# Patient Record
Sex: FEMALE | Race: WHITE | HISPANIC OR LATINO
[De-identification: names, ages, dates, MRNs, and addresses within clinical notes are randomized per-mention and may not be internally consistent; named-entity substitution may affect disease eponyms.]

---

## 2017-04-24 PROBLEM — Z00.00 ENCOUNTER FOR PREVENTIVE HEALTH EXAMINATION: Status: ACTIVE | Noted: 2017-04-24

## 2017-05-11 ENCOUNTER — APPOINTMENT (OUTPATIENT)
Dept: ORTHOPEDIC SURGERY | Facility: CLINIC | Age: 82
End: 2017-05-11

## 2017-05-11 RX ORDER — GENTAMICIN SULFATE 1 MG/G
0.1 OINTMENT TOPICAL
Qty: 30 | Refills: 0 | Status: ACTIVE | COMMUNITY
Start: 2017-05-03

## 2017-09-05 ENCOUNTER — APPOINTMENT (OUTPATIENT)
Dept: ORTHOPEDIC SURGERY | Facility: CLINIC | Age: 82
End: 2017-09-05
Payer: MEDICARE

## 2017-09-05 VITALS
WEIGHT: 120 LBS | HEART RATE: 67 BPM | DIASTOLIC BLOOD PRESSURE: 80 MMHG | HEIGHT: 66 IN | SYSTOLIC BLOOD PRESSURE: 110 MMHG | BODY MASS INDEX: 19.29 KG/M2

## 2017-09-05 PROCEDURE — 99213 OFFICE O/P EST LOW 20 MIN: CPT

## 2018-02-22 ENCOUNTER — APPOINTMENT (OUTPATIENT)
Dept: ORTHOPEDIC SURGERY | Facility: CLINIC | Age: 83
End: 2018-02-22
Payer: MEDICARE

## 2018-02-22 VITALS
BODY MASS INDEX: 19.29 KG/M2 | DIASTOLIC BLOOD PRESSURE: 85 MMHG | HEIGHT: 66 IN | WEIGHT: 120 LBS | HEART RATE: 93 BPM | SYSTOLIC BLOOD PRESSURE: 152 MMHG

## 2018-02-22 PROCEDURE — 73562 X-RAY EXAM OF KNEE 3: CPT | Mod: 50

## 2018-02-22 PROCEDURE — 99214 OFFICE O/P EST MOD 30 MIN: CPT

## 2018-05-29 ENCOUNTER — APPOINTMENT (OUTPATIENT)
Dept: ORTHOPEDIC SURGERY | Facility: CLINIC | Age: 83
End: 2018-05-29
Payer: MEDICARE

## 2018-05-29 VITALS
WEIGHT: 120 LBS | BODY MASS INDEX: 19.29 KG/M2 | SYSTOLIC BLOOD PRESSURE: 158 MMHG | HEART RATE: 88 BPM | HEIGHT: 66 IN | DIASTOLIC BLOOD PRESSURE: 84 MMHG

## 2018-05-29 PROCEDURE — 73564 X-RAY EXAM KNEE 4 OR MORE: CPT | Mod: 50

## 2018-05-29 PROCEDURE — 99213 OFFICE O/P EST LOW 20 MIN: CPT

## 2018-11-08 ENCOUNTER — APPOINTMENT (OUTPATIENT)
Dept: ORTHOPEDIC SURGERY | Facility: CLINIC | Age: 83
End: 2018-11-08

## 2018-11-18 ENCOUNTER — FORM ENCOUNTER (OUTPATIENT)
Age: 83
End: 2018-11-18

## 2018-11-19 ENCOUNTER — APPOINTMENT (OUTPATIENT)
Dept: ORTHOPEDIC SURGERY | Facility: CLINIC | Age: 83
End: 2018-11-19
Payer: MEDICARE

## 2018-11-19 ENCOUNTER — APPOINTMENT (OUTPATIENT)
Dept: RADIOLOGY | Facility: CLINIC | Age: 83
End: 2018-11-19

## 2018-11-19 ENCOUNTER — OUTPATIENT (OUTPATIENT)
Dept: OUTPATIENT SERVICES | Facility: HOSPITAL | Age: 83
LOS: 1 days | End: 2018-11-19
Payer: MEDICARE

## 2018-11-19 VITALS — WEIGHT: 125 LBS | HEIGHT: 66 IN | BODY MASS INDEX: 20.09 KG/M2

## 2018-11-19 DIAGNOSIS — Z87.39 PERSONAL HISTORY OF OTHER DISEASES OF THE MUSCULOSKELETAL SYSTEM AND CONNECTIVE TISSUE: ICD-10-CM

## 2018-11-19 DIAGNOSIS — S42.402A UNSPECIFIED FRACTURE OF LOWER END OF LEFT HUMERUS, INITIAL ENCOUNTER FOR CLOSED FRACTURE: ICD-10-CM

## 2018-11-19 DIAGNOSIS — M06.9 RHEUMATOID ARTHRITIS, UNSPECIFIED: ICD-10-CM

## 2018-11-19 PROCEDURE — 99214 OFFICE O/P EST MOD 30 MIN: CPT

## 2018-11-19 PROCEDURE — 73080 X-RAY EXAM OF ELBOW: CPT

## 2018-11-19 PROCEDURE — 73080 X-RAY EXAM OF ELBOW: CPT | Mod: 26,LT

## 2018-11-19 RX ORDER — AMOXICILLIN 500 MG/1
500 CAPSULE ORAL
Qty: 12 | Refills: 0 | Status: ACTIVE | COMMUNITY
Start: 2018-08-08

## 2018-11-19 RX ORDER — CEPHALEXIN 500 MG/1
500 CAPSULE ORAL
Qty: 20 | Refills: 0 | Status: ACTIVE | COMMUNITY
Start: 2018-07-26

## 2018-11-19 RX ORDER — MONTELUKAST 10 MG/1
10 TABLET, FILM COATED ORAL
Qty: 90 | Refills: 0 | Status: DISCONTINUED | COMMUNITY
Start: 2016-12-07 | End: 2018-11-19

## 2018-11-19 RX ORDER — PROMETHAZINE HYDROCHLORIDE AND CODEINE PHOSPHATE 6.25; 1 MG/5ML; MG/5ML
6.25-1 SOLUTION ORAL
Qty: 240 | Refills: 0 | Status: ACTIVE | COMMUNITY
Start: 2018-03-27

## 2018-11-19 RX ORDER — MONTELUKAST SODIUM 10 MG/1
TABLET, FILM COATED ORAL
Refills: 0 | Status: ACTIVE | COMMUNITY

## 2018-11-19 RX ORDER — FLUOCINONIDE 0.5 MG/ML
0.05 SOLUTION TOPICAL
Qty: 60 | Refills: 0 | Status: ACTIVE | COMMUNITY
Start: 2018-10-29

## 2018-11-19 RX ORDER — SILVER SULFADIAZINE 10 G/1000G
1 CREAM TOPICAL
Qty: 50 | Refills: 0 | Status: ACTIVE | COMMUNITY
Start: 2017-10-14

## 2018-11-19 RX ORDER — HYDROCHLOROTHIAZIDE 12.5 MG/1
12.5 CAPSULE ORAL
Refills: 0 | Status: ACTIVE | COMMUNITY

## 2018-11-19 RX ORDER — FLUCONAZOLE 150 MG/1
150 TABLET ORAL
Qty: 2 | Refills: 0 | Status: DISCONTINUED | COMMUNITY
Start: 2016-11-17 | End: 2018-11-19

## 2018-11-19 RX ORDER — DICLOFENAC SODIUM AND MISOPROSTOL 75; 200 MG/1; UG/1
75-0.2 TABLET, DELAYED RELEASE ORAL
Qty: 60 | Refills: 0 | Status: DISCONTINUED | COMMUNITY
Start: 2017-03-07 | End: 2018-11-19

## 2018-11-19 RX ORDER — HYDROCHLOROTHIAZIDE 12.5 MG/1
12.5 TABLET ORAL
Qty: 30 | Refills: 0 | Status: ACTIVE | COMMUNITY
Start: 2018-06-19

## 2018-11-27 ENCOUNTER — APPOINTMENT (OUTPATIENT)
Dept: ORTHOPEDIC SURGERY | Facility: CLINIC | Age: 83
End: 2018-11-27
Payer: MEDICARE

## 2018-11-27 VITALS
HEART RATE: 94 BPM | BODY MASS INDEX: 20.09 KG/M2 | DIASTOLIC BLOOD PRESSURE: 83 MMHG | SYSTOLIC BLOOD PRESSURE: 148 MMHG | WEIGHT: 125 LBS | HEIGHT: 66 IN

## 2018-11-27 DIAGNOSIS — S80.12XA CONTUSION OF LEFT KNEE, INITIAL ENCOUNTER: ICD-10-CM

## 2018-11-27 DIAGNOSIS — S80.02XA CONTUSION OF LEFT KNEE, INITIAL ENCOUNTER: ICD-10-CM

## 2018-11-27 DIAGNOSIS — S80.01XA CONTUSION OF RIGHT KNEE, INITIAL ENCOUNTER: ICD-10-CM

## 2018-11-27 PROCEDURE — 73562 X-RAY EXAM OF KNEE 3: CPT | Mod: 50

## 2018-11-27 PROCEDURE — 99214 OFFICE O/P EST MOD 30 MIN: CPT

## 2019-02-28 ENCOUNTER — APPOINTMENT (OUTPATIENT)
Dept: ORTHOPEDIC SURGERY | Facility: CLINIC | Age: 84
End: 2019-02-28

## 2019-04-04 ENCOUNTER — APPOINTMENT (OUTPATIENT)
Dept: ORTHOPEDIC SURGERY | Facility: CLINIC | Age: 84
End: 2019-04-04
Payer: MEDICARE

## 2019-04-04 PROCEDURE — 99213 OFFICE O/P EST LOW 20 MIN: CPT

## 2019-04-04 NOTE — ADDENDUM
[FreeTextEntry1] : Documented by Blossom Mari, solely acting as a scribe for Dr. Scott Fernandes on 04/04/2019.\par \par All medical record entries made by the Scribe were at my, Dr. Scott Fernandes, direction and personally dictated by me on 04/04/2019. I have reviewed the chart and agree that the record accurately reflects my personal performance of the history, physical exam, assessment and plan. I have also personally directed, reviewed, and agreed with the chart.

## 2019-04-04 NOTE — HISTORY OF PRESENT ILLNESS
[de-identified] : 83 year old female presents to the office s/p right TKA and left TKA revision, DOS: 11/19/10 for a follow-up evaluation of right knee pain. Patient was last seen on 11/27/18, where she was instructed to continue PT for further strengthening of the surrounding musculature of her knees, and advised to advance daily activities as tolerated. She notes that she had been progressing well with PT, noting no severe complaints. However, patient reports that she suddenly began experiencing immense hip pain that radiated down toward her knee, as well as a gait disturbance, for which she has begun to use a walker for aide in ambulation. Patient notes that she is not experiencing significance knee pain.

## 2019-04-04 NOTE — PHYSICAL EXAM
[Antalgic] : antalgic [Walker] : ambulates with walker [LE] : Sensory: Intact in bilateral lower extremities [DP] : dorsalis pedis 2+ and symmetric bilaterally [PT] : posterior tibial 2+ and symmetric bilaterally [Poor Appearance] : well-appearing [Acute Distress] : not in acute distress [Obese] : not obese [de-identified] : General appearance: Well nourished, well developed, pleasant, alert, and oriented x3.\par Respiratory: Breathing not labored, in no acute distress.\par HEENT: Normocephalic. EOM intact. Sclerae are clear.\par CV: No apparent abnormalities. No lower leg edema. No varicosities. Pedal pulses are palpable.\par Neurologic: Sensation is intact to light touch in the upper and lower extremities. \par Strength: No muscle weakness.\par Dermatologic: No apparent skin lesions or rash.\par Spine: C spine and L spine appear normal and move freely, normal and nontender.\par Upper Extremities: Hands, wrists, and elbows are normal and move freely. Shoulders are normal and move freely. All range of motion is symmetrical.\par Normal body habitus. Pulses are palpable.\par Review of systems, please see form for complete details. Medical data sheet was reviewed.\par \par Left knee: FROM hip, no effusion, 0 - 100, no crepitus, no medial pain, no lateral pain, no Lachman, no pivot shift, no anterior drawer, no posterior drawer, stable, anatomic alignment, extension intact with no gaps\par Right knee: FROM hip, trace effusion, 0 - 105, mild crepitus, no medial pain, no lateral pain, no Lachman, no pivot shift, no anterior drawer, no posterior drawer, stable, anatomic alignment, extension intact with no gaps\par   [de-identified] : MRI of the hip reviewed today by Dr. Fernandes shows severe arthritis.\par Please see attached report for details.\par \par

## 2019-04-04 NOTE — DISCUSSION/SUMMARY
[de-identified] : 83 year old female presents s/p right TKA and left TKA revision, DOS: 11/19/10. She notes that she had been progressing well with PT, noting no severe complaints. However, patient reports that she suddenly began experiencing immense hip pain that radiated down toward her knee, as well as a gait disturbance, for which she has begun to use a walker for aide in ambulation. Patient notes that she is not experiencing significance knee pain. We discussed the nature of the condition and treatment options, including operative and nonoperative intervention for symptom control. Information regarding the new office was provided to the patient, and she was advised to contact the office if any questions remain. As the patient's current insurance is not accepted in the new office, a referral will be provided to Dr. Damon for her hip pain and her knees for further treatment.

## 2019-04-16 ENCOUNTER — FORM ENCOUNTER (OUTPATIENT)
Age: 84
End: 2019-04-16

## 2019-04-17 ENCOUNTER — APPOINTMENT (OUTPATIENT)
Dept: ORTHOPEDIC SURGERY | Facility: CLINIC | Age: 84
End: 2019-04-17
Payer: MEDICARE

## 2019-04-17 ENCOUNTER — OUTPATIENT (OUTPATIENT)
Dept: OUTPATIENT SERVICES | Facility: HOSPITAL | Age: 84
LOS: 1 days | End: 2019-04-17
Payer: MEDICARE

## 2019-04-17 DIAGNOSIS — M16.11 UNILATERAL PRIMARY OSTEOARTHRITIS, RIGHT HIP: ICD-10-CM

## 2019-04-17 PROCEDURE — 99214 OFFICE O/P EST MOD 30 MIN: CPT

## 2019-04-17 PROCEDURE — 73521 X-RAY EXAM HIPS BI 2 VIEWS: CPT | Mod: 26

## 2019-04-17 PROCEDURE — 73521 X-RAY EXAM HIPS BI 2 VIEWS: CPT

## 2019-04-18 VITALS — HEIGHT: 66 IN | BODY MASS INDEX: 19.29 KG/M2 | WEIGHT: 120 LBS

## 2019-04-19 PROBLEM — M16.11 ARTHRITIS OF RIGHT HIP: Status: ACTIVE | Noted: 2019-04-19

## 2019-04-19 NOTE — ASSESSMENT
[FreeTextEntry1] : Assessment\par #1 severe right hip arthritis\par \par Plan\par #1 is for a right total hip arthroplasty at the patient's convenience. She'll undergo the normal preoperative clearance pathway. Risks benefits alternatives discussed in detail. This will be scheduled for the earliest mutual convenience. All questions answered today.

## 2019-04-19 NOTE — PHYSICAL EXAM
[de-identified] : X-rays of the right hip shows severe show severe arthritic changes [de-identified] : General: Not in acute distress, dressed appropriately, sitting on examination table\par Skin: Warm and dry, normal turgor, no rashes\par Neurological: AOx3, Cranial nerves grossly in tact\par Psych: Mood and affect appropriate\par \par Right Hip: No swelling edema erythema redness or drainage. Tender anteriorly and laterally. ROM: Hip flexion 120, abduction 30, int/ext rotation 45.  Pain present throughout range of motion, and guarding at terminal flexion. 5/5 strength. Normal gait.

## 2019-04-19 NOTE — HISTORY OF PRESENT ILLNESS
[de-identified] : 83-year-old lady here today for evaluation of right hip pain. Has been increasing for the last 3 years plus. It is located in the anterior groin and is nonradiating. She describes it as dull aching throbbing, worse with weightbearing and ambulation. She is minimally ambulating because of the pain. In the past she has attempted physical therapy, medications, modify activities, and injections. These used to be helpful in relieving her pain but are no longer period

## 2019-05-16 ENCOUNTER — INPATIENT (INPATIENT)
Facility: HOSPITAL | Age: 84
LOS: 3 days | Discharge: NURSING, REHAB OR LONG-TERM CARE | DRG: 470 | End: 2019-05-20
Attending: ORTHOPAEDIC SURGERY | Admitting: ORTHOPAEDIC SURGERY
Payer: MEDICARE

## 2019-05-16 ENCOUNTER — APPOINTMENT (OUTPATIENT)
Dept: ORTHOPEDIC SURGERY | Facility: HOSPITAL | Age: 84
End: 2019-05-16
Payer: MEDICARE

## 2019-05-16 VITALS
WEIGHT: 119.93 LBS | OXYGEN SATURATION: 98 % | HEART RATE: 77 BPM | TEMPERATURE: 98 F | DIASTOLIC BLOOD PRESSURE: 76 MMHG | SYSTOLIC BLOOD PRESSURE: 134 MMHG | RESPIRATION RATE: 20 BRPM | HEIGHT: 65 IN

## 2019-05-16 DIAGNOSIS — E11.9 TYPE 2 DIABETES MELLITUS WITHOUT COMPLICATIONS: ICD-10-CM

## 2019-05-16 DIAGNOSIS — Z98.890 OTHER SPECIFIED POSTPROCEDURAL STATES: Chronic | ICD-10-CM

## 2019-05-16 DIAGNOSIS — N28.9 DISORDER OF KIDNEY AND URETER, UNSPECIFIED: ICD-10-CM

## 2019-05-16 DIAGNOSIS — D64.9 ANEMIA, UNSPECIFIED: ICD-10-CM

## 2019-05-16 DIAGNOSIS — K21.9 GASTRO-ESOPHAGEAL REFLUX DISEASE WITHOUT ESOPHAGITIS: ICD-10-CM

## 2019-05-16 DIAGNOSIS — G47.30 SLEEP APNEA, UNSPECIFIED: ICD-10-CM

## 2019-05-16 DIAGNOSIS — I48.91 UNSPECIFIED ATRIAL FIBRILLATION: ICD-10-CM

## 2019-05-16 DIAGNOSIS — I10 ESSENTIAL (PRIMARY) HYPERTENSION: ICD-10-CM

## 2019-05-16 DIAGNOSIS — Z90.49 ACQUIRED ABSENCE OF OTHER SPECIFIED PARTS OF DIGESTIVE TRACT: Chronic | ICD-10-CM

## 2019-05-16 DIAGNOSIS — Z41.9 ENCOUNTER FOR PROCEDURE FOR PURPOSES OTHER THAN REMEDYING HEALTH STATE, UNSPECIFIED: Chronic | ICD-10-CM

## 2019-05-16 DIAGNOSIS — M16.11 UNILATERAL PRIMARY OSTEOARTHRITIS, RIGHT HIP: ICD-10-CM

## 2019-05-16 DIAGNOSIS — Z96.653 PRESENCE OF ARTIFICIAL KNEE JOINT, BILATERAL: Chronic | ICD-10-CM

## 2019-05-16 LAB
GLUCOSE BLDC GLUCOMTR-MCNC: 98 MG/DL — SIGNIFICANT CHANGE UP (ref 70–99)
HCT VFR BLD CALC: 32.4 % — LOW (ref 34.5–45)
HGB BLD-MCNC: 10.3 G/DL — LOW (ref 11.5–15.5)
MCHC RBC-ENTMCNC: 29.9 PG — SIGNIFICANT CHANGE UP (ref 27–34)
MCHC RBC-ENTMCNC: 31.8 GM/DL — LOW (ref 32–36)
MCV RBC AUTO: 93.9 FL — SIGNIFICANT CHANGE UP (ref 80–100)
MRSA PCR RESULT.: NEGATIVE — SIGNIFICANT CHANGE UP
NRBC # BLD: 0 /100 WBCS — SIGNIFICANT CHANGE UP (ref 0–0)
PLATELET # BLD AUTO: 249 K/UL — SIGNIFICANT CHANGE UP (ref 150–400)
RBC # BLD: 3.45 M/UL — LOW (ref 3.8–5.2)
RBC # FLD: 13.4 % — SIGNIFICANT CHANGE UP (ref 10.3–14.5)
S AUREUS DNA NOSE QL NAA+PROBE: NEGATIVE — SIGNIFICANT CHANGE UP
WBC # BLD: 11.69 K/UL — HIGH (ref 3.8–10.5)
WBC # FLD AUTO: 11.69 K/UL — HIGH (ref 3.8–10.5)

## 2019-05-16 PROCEDURE — 27130 TOTAL HIP ARTHROPLASTY: CPT | Mod: AS,RT

## 2019-05-16 PROCEDURE — 27130 TOTAL HIP ARTHROPLASTY: CPT | Mod: RT

## 2019-05-16 PROCEDURE — 72170 X-RAY EXAM OF PELVIS: CPT | Mod: 26

## 2019-05-16 RX ORDER — SODIUM CHLORIDE 9 MG/ML
1000 INJECTION, SOLUTION INTRAVENOUS
Refills: 0 | Status: DISCONTINUED | OUTPATIENT
Start: 2019-05-16 | End: 2019-05-20

## 2019-05-16 RX ORDER — DICLOFENAC SODIUM/MISOPROSTOL 50 MG-200
1 TABLET,IMMEDIATE,DELAY RELEASE,BIPHASE ORAL
Qty: 0 | Refills: 0 | DISCHARGE

## 2019-05-16 RX ORDER — CELECOXIB 200 MG/1
200 CAPSULE ORAL
Refills: 0 | Status: DISCONTINUED | OUTPATIENT
Start: 2019-05-18 | End: 2019-05-20

## 2019-05-16 RX ORDER — ASPIRIN/CALCIUM CARB/MAGNESIUM 324 MG
81 TABLET ORAL
Refills: 0 | Status: DISCONTINUED | OUTPATIENT
Start: 2019-05-16 | End: 2019-05-17

## 2019-05-16 RX ORDER — MONTELUKAST 4 MG/1
10 TABLET, CHEWABLE ORAL DAILY
Refills: 0 | Status: DISCONTINUED | OUTPATIENT
Start: 2019-05-16 | End: 2019-05-20

## 2019-05-16 RX ORDER — ACETAMINOPHEN 500 MG
1000 TABLET ORAL ONCE
Refills: 0 | Status: COMPLETED | OUTPATIENT
Start: 2019-05-16 | End: 2019-05-16

## 2019-05-16 RX ORDER — DIPHENHYDRAMINE HCL 50 MG
25 CAPSULE ORAL AT BEDTIME
Refills: 0 | Status: DISCONTINUED | OUTPATIENT
Start: 2019-05-16 | End: 2019-05-20

## 2019-05-16 RX ORDER — HYDROMORPHONE HYDROCHLORIDE 2 MG/ML
0.5 INJECTION INTRAMUSCULAR; INTRAVENOUS; SUBCUTANEOUS EVERY 4 HOURS
Refills: 0 | Status: DISCONTINUED | OUTPATIENT
Start: 2019-05-16 | End: 2019-05-20

## 2019-05-16 RX ORDER — ACETAMINOPHEN 500 MG
650 TABLET ORAL EVERY 6 HOURS
Refills: 0 | Status: COMPLETED | OUTPATIENT
Start: 2019-05-16 | End: 2019-05-19

## 2019-05-16 RX ORDER — OXYCODONE HYDROCHLORIDE 5 MG/1
5 TABLET ORAL EVERY 4 HOURS
Refills: 0 | Status: DISCONTINUED | OUTPATIENT
Start: 2019-05-16 | End: 2019-05-20

## 2019-05-16 RX ORDER — DOCUSATE SODIUM 100 MG
100 CAPSULE ORAL THREE TIMES A DAY
Refills: 0 | Status: DISCONTINUED | OUTPATIENT
Start: 2019-05-16 | End: 2019-05-20

## 2019-05-16 RX ORDER — ASPIRIN/CALCIUM CARB/MAGNESIUM 324 MG
325 TABLET ORAL
Refills: 0 | Status: DISCONTINUED | OUTPATIENT
Start: 2019-05-16 | End: 2019-05-16

## 2019-05-16 RX ORDER — MORPHINE SULFATE 50 MG/1
4 CAPSULE, EXTENDED RELEASE ORAL ONCE
Refills: 0 | Status: DISCONTINUED | OUTPATIENT
Start: 2019-05-16 | End: 2019-05-20

## 2019-05-16 RX ORDER — CELECOXIB 200 MG/1
200 CAPSULE ORAL
Refills: 0 | Status: DISCONTINUED | OUTPATIENT
Start: 2019-05-16 | End: 2019-05-16

## 2019-05-16 RX ORDER — MAGNESIUM HYDROXIDE 400 MG/1
30 TABLET, CHEWABLE ORAL
Refills: 0 | Status: DISCONTINUED | OUTPATIENT
Start: 2019-05-16 | End: 2019-05-20

## 2019-05-16 RX ORDER — CEFAZOLIN SODIUM 1 G
2000 VIAL (EA) INJECTION EVERY 8 HOURS
Refills: 0 | Status: COMPLETED | OUTPATIENT
Start: 2019-05-16 | End: 2019-05-17

## 2019-05-16 RX ORDER — OXYCODONE HYDROCHLORIDE 5 MG/1
10 TABLET ORAL EVERY 4 HOURS
Refills: 0 | Status: DISCONTINUED | OUTPATIENT
Start: 2019-05-16 | End: 2019-05-20

## 2019-05-16 RX ORDER — METOPROLOL TARTRATE 50 MG
25 TABLET ORAL DAILY
Refills: 0 | Status: DISCONTINUED | OUTPATIENT
Start: 2019-05-16 | End: 2019-05-20

## 2019-05-16 RX ORDER — TRAMADOL HYDROCHLORIDE 50 MG/1
50 TABLET ORAL EVERY 8 HOURS
Refills: 0 | Status: DISCONTINUED | OUTPATIENT
Start: 2019-05-16 | End: 2019-05-20

## 2019-05-16 RX ORDER — CELECOXIB 200 MG/1
400 CAPSULE ORAL ONCE
Refills: 0 | Status: COMPLETED | OUTPATIENT
Start: 2019-05-16 | End: 2019-05-16

## 2019-05-16 RX ORDER — FLUTICASONE PROPIONATE 50 MCG
1 SPRAY, SUSPENSION NASAL DAILY
Refills: 0 | Status: DISCONTINUED | OUTPATIENT
Start: 2019-05-16 | End: 2019-05-20

## 2019-05-16 RX ORDER — SENNA PLUS 8.6 MG/1
2 TABLET ORAL AT BEDTIME
Refills: 0 | Status: DISCONTINUED | OUTPATIENT
Start: 2019-05-16 | End: 2019-05-20

## 2019-05-16 RX ORDER — ONDANSETRON 8 MG/1
4 TABLET, FILM COATED ORAL EVERY 6 HOURS
Refills: 0 | Status: DISCONTINUED | OUTPATIENT
Start: 2019-05-16 | End: 2019-05-20

## 2019-05-16 RX ORDER — APREPITANT 80 MG/1
40 CAPSULE ORAL ONCE
Refills: 0 | Status: COMPLETED | OUTPATIENT
Start: 2019-05-16 | End: 2019-05-16

## 2019-05-16 RX ORDER — PANTOPRAZOLE SODIUM 20 MG/1
40 TABLET, DELAYED RELEASE ORAL
Refills: 0 | Status: DISCONTINUED | OUTPATIENT
Start: 2019-05-16 | End: 2019-05-20

## 2019-05-16 RX ORDER — POLYETHYLENE GLYCOL 3350 17 G/17G
17 POWDER, FOR SOLUTION ORAL DAILY
Refills: 0 | Status: DISCONTINUED | OUTPATIENT
Start: 2019-05-16 | End: 2019-05-20

## 2019-05-16 RX ORDER — HYDROCHLOROTHIAZIDE 25 MG
12.5 TABLET ORAL DAILY
Refills: 0 | Status: DISCONTINUED | OUTPATIENT
Start: 2019-05-16 | End: 2019-05-20

## 2019-05-16 RX ORDER — CEFAZOLIN SODIUM 1 G
2000 VIAL (EA) INJECTION EVERY 8 HOURS
Refills: 0 | Status: DISCONTINUED | OUTPATIENT
Start: 2019-05-16 | End: 2019-05-16

## 2019-05-16 RX ORDER — NITROFURANTOIN MACROCRYSTAL 50 MG
1 CAPSULE ORAL
Qty: 0 | Refills: 0 | DISCHARGE

## 2019-05-16 RX ADMIN — OXYCODONE HYDROCHLORIDE 5 MILLIGRAM(S): 5 TABLET ORAL at 20:30

## 2019-05-16 RX ADMIN — Medication 1000 MILLIGRAM(S): at 09:46

## 2019-05-16 RX ADMIN — OXYCODONE HYDROCHLORIDE 5 MILLIGRAM(S): 5 TABLET ORAL at 19:56

## 2019-05-16 RX ADMIN — TRAMADOL HYDROCHLORIDE 50 MILLIGRAM(S): 50 TABLET ORAL at 21:30

## 2019-05-16 RX ADMIN — Medication 100 MILLIGRAM(S): at 21:30

## 2019-05-16 RX ADMIN — APREPITANT 40 MILLIGRAM(S): 80 CAPSULE ORAL at 09:47

## 2019-05-16 RX ADMIN — CELECOXIB 400 MILLIGRAM(S): 200 CAPSULE ORAL at 09:47

## 2019-05-16 RX ADMIN — Medication 650 MILLIGRAM(S): at 21:30

## 2019-05-16 RX ADMIN — TRAMADOL HYDROCHLORIDE 50 MILLIGRAM(S): 50 TABLET ORAL at 21:58

## 2019-05-16 RX ADMIN — Medication 2000 MILLIGRAM(S): at 21:30

## 2019-05-16 RX ADMIN — Medication 650 MILLIGRAM(S): at 21:58

## 2019-05-16 NOTE — PHYSICAL THERAPY INITIAL EVALUATION ADULT - ADDITIONAL COMMENTS
Patient lives alone in private house with lift access followed by 4 steps to enter. Reports being independent with all ADLs prior to January ambulating without any Assistive Devices. However recently started using RW due to pain. Owns , SC, Commode.

## 2019-05-16 NOTE — H&P ADULT - NSHPPHYSICALEXAM_GEN_ALL_CORE
Right LE: Hip decreased ROM secondary to pain, sensation intact, DP 2+, brisk cap refill, EHL/FHL/TA/GS 5/5  Rest of PE per MD clearance

## 2019-05-16 NOTE — PHYSICAL THERAPY INITIAL EVALUATION ADULT - PERTINENT HX OF CURRENT PROBLEM, REHAB EVAL
83 y.o  F with PMH of RA, Afib, Anemia, GERD, Renal insufficiency, Sleep apnea, UTI, and PSH o fb/l hand reconstruction x2, b/l feet surgery (pins). Patient c/o right hip pain worsened over time without improvement. Failed conservative treatments. Denies numbness, tingling.. Now s/p right THR

## 2019-05-16 NOTE — PHYSICAL THERAPY INITIAL EVALUATION ADULT - GENERAL OBSERVATIONS, REHAB EVAL
Patient received semi-supine in no acute distress, +Telemetry, +O2, +SCDs, +Ice packs. Cleared by SHANNAN Palacios.

## 2019-05-16 NOTE — H&P ADULT - NSHPLABSRESULTS_GEN_ALL_CORE
Preop cbc, bmp, pt/inr, ptt, ua wnl; nasal swab dos  preop cxr wnl per clearance  preop ekg wnl per clearance   echo 12/23/18 nl LV func, EF 61-65% [place]

## 2019-05-16 NOTE — H&P ADULT - NSICDXPASTSURGICALHX_GEN_ALL_CORE_FT
PAST SURGICAL HISTORY:  H/O total knee replacement, bilateral     History of appendectomy     History of cholecystectomy     History of nasal surgery     History of surgery b/l hand reconstruction x2  with plastic hardware b/l    History of surgery b/l feet with pins

## 2019-05-16 NOTE — ASU PREOP CHECKLIST - 3.
nasal MRSA/MSSA screen nasal MRSA/MSSA screen completed on arrival followed by 3M nasal antiseptic application.

## 2019-05-16 NOTE — ASU PREOP CHECKLIST - 5.
Normal vision: sees adequately in most situations; can see medication labels, newsprint pt placed to cardiac monitor.  SR, HR 75-77. last dose metoprolol and eliquis 5/10/19.  Joss RICHARD and Dr. Solis notified.  pt placed to cardiac monitor.  SR, HR 75-77.

## 2019-05-16 NOTE — ASU PREOP CHECKLIST - 1.
bathroom offered bathroom offered.  pt assisted to bathroom with assist of 2.  placed to wheelchair upon completion for OR

## 2019-05-16 NOTE — PROGRESS NOTE ADULT - SUBJECTIVE AND OBJECTIVE BOX
Orthopedics Post Op Check    Procedure: RIGHT THR  Surgeon: LISET    Pt. stable, laying in bed comfortably ( was sleep and snoring loudly in pacu earlier 2/2 to desaturation/JENSEN). Pt. states she doesn't use cpap but feels better with blow-by on given by nurse.   Denies CP, SOB, N/V, numbness/tingling in b/l les.     Vital Signs Last 24 Hrs  T(C): 36.2 (16 May 2019 12:31), Max: 36.7 (16 May 2019 07:51)  T(F): 97.1 (16 May 2019 12:31), Max: 98.1 (16 May 2019 07:51)  HR: 82 (16 May 2019 14:46) (76 - 86)  BP: 146/61 (16 May 2019 14:46) (134/76 - 154/67)  BP(mean): 88 (16 May 2019 14:46) (88 - 97)  RR: 17 (16 May 2019 14:46) (14 - 20)  SpO2: 100% (16 May 2019 14:46) (92% - 100%)      Dressing C/D/I    Pulses: DP/PT 2+B/L LES  SLT:  intact B/L LES  Motor:  EHL/FHL- unable 2/2 to foot surgery TA/GS  5/5 b/l les                          10.3   11.69 )-----------( 249      ( 16 May 2019 14:52 )             32.4         Post op XR: prosthesis in place    A/P: 83yoFemale POD#0 s/p RIGHT THR  - Stable  - Pain Control  - DVT ppx:ASA, eloquis for afib  - Post op abx: ancef  - PT, WBS: pwb right le, wbat left le  - F/U AM Labs  - JENSEN hx, ordered cpap d/w patient 2/2 to desaturation to 80s, currently on 3L  O2 sat

## 2019-05-16 NOTE — H&P ADULT - NSICDXPASTMEDICALHX_GEN_ALL_CORE_FT
PAST MEDICAL HISTORY:  Afib     Anemia     DM (diabetes mellitus) non insulin dependent as per Dr. Choudhury H&P.  pt denies    Dysuria     GERD (gastroesophageal reflux disease)     Hip pain     HTN (hypertension)     Osteoarthritis     Osteoporosis     Renal insufficiency     Rheumatoid arthritis     Seasonal allergies     Sleep apnea does not use cpap    Stress incontinence, female     UTI (urinary tract infection)

## 2019-05-16 NOTE — PHYSICAL THERAPY INITIAL EVALUATION ADULT - CRITERIA FOR SKILLED THERAPEUTIC INTERVENTIONS
impairments found/functional limitations in following categories/rehab potential/predicted duration of therapy intervention/risk reduction/prevention/therapy frequency/anticipated discharge recommendation

## 2019-05-16 NOTE — H&P ADULT - HISTORY OF PRESENT ILLNESS
83F c/o right hip pain worsened over time without improvement. Failed conservative treatments. Denies numbness, tingling. Ambulates without assist. Presents today for elective right THR.

## 2019-05-17 ENCOUNTER — TRANSCRIPTION ENCOUNTER (OUTPATIENT)
Age: 84
End: 2019-05-17

## 2019-05-17 LAB
ANION GAP SERPL CALC-SCNC: 11 MMOL/L — SIGNIFICANT CHANGE UP (ref 5–17)
BUN SERPL-MCNC: 23 MG/DL — SIGNIFICANT CHANGE UP (ref 7–23)
CALCIUM SERPL-MCNC: 8.4 MG/DL — SIGNIFICANT CHANGE UP (ref 8.4–10.5)
CHLORIDE SERPL-SCNC: 96 MMOL/L — SIGNIFICANT CHANGE UP (ref 96–108)
CO2 SERPL-SCNC: 26 MMOL/L — SIGNIFICANT CHANGE UP (ref 22–31)
CREAT SERPL-MCNC: 0.9 MG/DL — SIGNIFICANT CHANGE UP (ref 0.5–1.3)
GLUCOSE SERPL-MCNC: 136 MG/DL — HIGH (ref 70–99)
HCT VFR BLD CALC: 29 % — LOW (ref 34.5–45)
HGB BLD-MCNC: 9.2 G/DL — LOW (ref 11.5–15.5)
MCHC RBC-ENTMCNC: 29.9 PG — SIGNIFICANT CHANGE UP (ref 27–34)
MCHC RBC-ENTMCNC: 31.7 GM/DL — LOW (ref 32–36)
MCV RBC AUTO: 94.2 FL — SIGNIFICANT CHANGE UP (ref 80–100)
NRBC # BLD: 0 /100 WBCS — SIGNIFICANT CHANGE UP (ref 0–0)
PLATELET # BLD AUTO: 228 K/UL — SIGNIFICANT CHANGE UP (ref 150–400)
POTASSIUM SERPL-MCNC: 3.3 MMOL/L — LOW (ref 3.5–5.3)
POTASSIUM SERPL-SCNC: 3.3 MMOL/L — LOW (ref 3.5–5.3)
RBC # BLD: 3.08 M/UL — LOW (ref 3.8–5.2)
RBC # FLD: 13.4 % — SIGNIFICANT CHANGE UP (ref 10.3–14.5)
SODIUM SERPL-SCNC: 133 MMOL/L — LOW (ref 135–145)
WBC # BLD: 8.49 K/UL — SIGNIFICANT CHANGE UP (ref 3.8–10.5)
WBC # FLD AUTO: 8.49 K/UL — SIGNIFICANT CHANGE UP (ref 3.8–10.5)

## 2019-05-17 PROCEDURE — 99223 1ST HOSP IP/OBS HIGH 75: CPT | Mod: GC

## 2019-05-17 RX ORDER — DEXTROSE 50 % IN WATER 50 %
25 SYRINGE (ML) INTRAVENOUS ONCE
Refills: 0 | Status: DISCONTINUED | OUTPATIENT
Start: 2019-05-17 | End: 2019-05-20

## 2019-05-17 RX ORDER — SODIUM CHLORIDE 9 MG/ML
1000 INJECTION, SOLUTION INTRAVENOUS
Refills: 0 | Status: DISCONTINUED | OUTPATIENT
Start: 2019-05-17 | End: 2019-05-20

## 2019-05-17 RX ORDER — GLUCAGON INJECTION, SOLUTION 0.5 MG/.1ML
1 INJECTION, SOLUTION SUBCUTANEOUS ONCE
Refills: 0 | Status: DISCONTINUED | OUTPATIENT
Start: 2019-05-17 | End: 2019-05-20

## 2019-05-17 RX ORDER — APIXABAN 2.5 MG/1
2.5 TABLET, FILM COATED ORAL EVERY 12 HOURS
Refills: 0 | Status: DISCONTINUED | OUTPATIENT
Start: 2019-05-17 | End: 2019-05-20

## 2019-05-17 RX ORDER — DEXTROSE 50 % IN WATER 50 %
15 SYRINGE (ML) INTRAVENOUS ONCE
Refills: 0 | Status: DISCONTINUED | OUTPATIENT
Start: 2019-05-17 | End: 2019-05-20

## 2019-05-17 RX ORDER — DEXTROSE 50 % IN WATER 50 %
12.5 SYRINGE (ML) INTRAVENOUS ONCE
Refills: 0 | Status: DISCONTINUED | OUTPATIENT
Start: 2019-05-17 | End: 2019-05-20

## 2019-05-17 RX ORDER — INSULIN LISPRO 100/ML
VIAL (ML) SUBCUTANEOUS
Refills: 0 | Status: DISCONTINUED | OUTPATIENT
Start: 2019-05-17 | End: 2019-05-17

## 2019-05-17 RX ORDER — POTASSIUM CHLORIDE 20 MEQ
20 PACKET (EA) ORAL ONCE
Refills: 0 | Status: COMPLETED | OUTPATIENT
Start: 2019-05-17 | End: 2019-05-18

## 2019-05-17 RX ADMIN — TRAMADOL HYDROCHLORIDE 50 MILLIGRAM(S): 50 TABLET ORAL at 21:55

## 2019-05-17 RX ADMIN — Medication 2000 MILLIGRAM(S): at 05:43

## 2019-05-17 RX ADMIN — TRAMADOL HYDROCHLORIDE 50 MILLIGRAM(S): 50 TABLET ORAL at 14:51

## 2019-05-17 RX ADMIN — POLYETHYLENE GLYCOL 3350 17 GRAM(S): 17 POWDER, FOR SOLUTION ORAL at 13:13

## 2019-05-17 RX ADMIN — Medication 650 MILLIGRAM(S): at 18:30

## 2019-05-17 RX ADMIN — Medication 12.5 MILLIGRAM(S): at 05:43

## 2019-05-17 RX ADMIN — Medication 650 MILLIGRAM(S): at 13:14

## 2019-05-17 RX ADMIN — Medication 650 MILLIGRAM(S): at 18:32

## 2019-05-17 RX ADMIN — Medication 1 SPRAY(S): at 18:31

## 2019-05-17 RX ADMIN — MAGNESIUM HYDROXIDE 30 MILLILITER(S): 400 TABLET, CHEWABLE ORAL at 21:54

## 2019-05-17 RX ADMIN — PANTOPRAZOLE SODIUM 40 MILLIGRAM(S): 20 TABLET, DELAYED RELEASE ORAL at 05:43

## 2019-05-17 RX ADMIN — TRAMADOL HYDROCHLORIDE 50 MILLIGRAM(S): 50 TABLET ORAL at 15:10

## 2019-05-17 RX ADMIN — MONTELUKAST 10 MILLIGRAM(S): 4 TABLET, CHEWABLE ORAL at 13:14

## 2019-05-17 RX ADMIN — Medication 100 MILLIGRAM(S): at 21:55

## 2019-05-17 RX ADMIN — Medication 650 MILLIGRAM(S): at 05:43

## 2019-05-17 RX ADMIN — Medication 81 MILLIGRAM(S): at 05:43

## 2019-05-17 RX ADMIN — TRAMADOL HYDROCHLORIDE 50 MILLIGRAM(S): 50 TABLET ORAL at 05:43

## 2019-05-17 RX ADMIN — Medication 100 MILLIGRAM(S): at 14:51

## 2019-05-17 RX ADMIN — SENNA PLUS 2 TABLET(S): 8.6 TABLET ORAL at 21:54

## 2019-05-17 RX ADMIN — Medication 25 MILLIGRAM(S): at 05:43

## 2019-05-17 RX ADMIN — Medication 650 MILLIGRAM(S): at 13:45

## 2019-05-17 RX ADMIN — TRAMADOL HYDROCHLORIDE 50 MILLIGRAM(S): 50 TABLET ORAL at 22:45

## 2019-05-17 RX ADMIN — Medication 100 MILLIGRAM(S): at 05:43

## 2019-05-17 RX ADMIN — APIXABAN 2.5 MILLIGRAM(S): 2.5 TABLET, FILM COATED ORAL at 18:32

## 2019-05-17 NOTE — CONSULT NOTE ADULT - ASSESSMENT
82 y/o F with RA, Afib, DM, HTN, GERD who presents for Right THR:    1) Post-op state Right THR  * OOB-C  * Physical therapy evaluation  * Aggressive incentive spirometry  * Pain control and bowel regimen  * Mechanical LE ppx     2) Afib: Cont eliquis and metoprolol    3) Essential HTN: Controlled, cont current regimen    4) Constipation: Please give an additional dose of miralax if no BM today by this afternoon    5) GERD: Cont PPI    6) Anemia: Monitoring CBC daily, no signs of bleeding    7) Hyponatremia: Suspect a small component of hypovolemia. Cont to monitor Chem 7

## 2019-05-17 NOTE — OCCUPATIONAL THERAPY INITIAL EVALUATION ADULT - WEIGHT-BEARING RESTRICTIONS: TOILET, REHAB EVAL
Patient lives alone in private house with lift access followed by 4 steps to enter. Reports being independent with all ADLs prior to January ambulating without any Assistive Devices. However recently started using RW due to pain. Owns RW, SC, Commode./partial weight-bearing

## 2019-05-17 NOTE — OCCUPATIONAL THERAPY INITIAL EVALUATION ADULT - WEIGHT-BEARING RESTRICTIONS: STAND/SIT, REHAB EVAL
partial weight-bearing/Patient lives alone in private house with lift access followed by 4 steps to enter. Reports being independent with all ADLs prior to January ambulating without any Assistive Devices. However recently started using RW due to pain. Owns RW, SC, Commode.

## 2019-05-17 NOTE — DISCHARGE NOTE PROVIDER - CARE PROVIDER_API CALL
Rojas Damon)  Orthopaedic Surgery  [address]  [place], [place] [address]  Phone: [phone]  Fax: [phone]  Follow Up Time:

## 2019-05-17 NOTE — PROGRESS NOTE ADULT - SUBJECTIVE AND OBJECTIVE BOX
Ortho Note    Pt comfortable without complaints, pain controlled.  Denies CP, SOB, N/V, numbness/tingling down le b/l    Vital Signs Last 24 Hrs  T(C): --  T(F): --  HR: 78 (05-17-19 @ 08:42) (78 - 78)  BP: --  BP(mean): --  RR: 17 (05-17-19 @ 08:42) (17 - 17)  SpO2: 98% (05-17-19 @ 08:42) (98% - 98%)      General: Pt Alert and oriented, NAD  DSG gauze tegaderm right hip C/D/I  Pulses: DPs +2 b/l   Sensation:  SILT throughout le b/l  Motor: EHL/FHL/TA/GS                          9.2    8.49  )-----------( 228      ( 17 May 2019 07:59 )             29.0   17 May 2019 07:59    133    |  96     |  23     ----------------------------<  136    3.3     |  26     |  0.90     Ca    8.4        17 May 2019 07:59        A/P: 83yFemale POD#1 s/p right OLIVIA   - Stable  - Pain Control as needed  - DVT ppx:  home eliquis  - PT, WBS: 50% PWB RLE  - Trend labs  - May restart home dose eliquis today  - Dispo: felipa pending auth    Ortho Pager [phone]

## 2019-05-17 NOTE — DISCHARGE NOTE PROVIDER - NSDCFUADDINST_GEN_ALL_CORE_FT
50% protected weight on operated hip using a rolling walker  Anterior and posterior hip precautions to prevent hip dislocation  No crossing your legs. Do not bend past your waist.  Use long-handled reach to pick things up if purchased.  Sit in a high chair.  If you do not have a high chair, use cushions on the chair  No strenuous activity, heavy lifting, driving or returning to work until cleared by MD.  You may shower - dressing is water-resistant, no soaking in bathtubs.  Keep dressing on your hip until the follow up appointment.  Try to have regular bowel movements, take stool softener or laxative if necessary.  May take Pepcid or Zantac for upset stomach.  Swelling may travel all the way down leg to foot, this is normal and will subside in a few weeks.  Call to schedule an appt with Dr. Damon for follow up.    Contact your doctor if you experience: fever greater than 101.5, chills, chest pain, difficulty breathing, redness or excessive drainage around the incision, other concerns.  Follow up with your primary care provider.

## 2019-05-17 NOTE — PROGRESS NOTE ADULT - SUBJECTIVE AND OBJECTIVE BOX
Orthopedics    Pt. stable, laying in bed comfortably  Denies CP, SOB, N/V, numbness/tingling in b/l les.     Vital Signs Last 24 Hrs  T(C): 36.3 (17 May 2019 04:59), Max: 36.3 (16 May 2019 16:06)  T(F): 97.4 (17 May 2019 04:59), Max: 97.4 (17 May 2019 01:01)  HR: 83 (17 May 2019 04:59) (76 - 96)  BP: 131/60 (17 May 2019 04:59) (122/58 - 161/70)  BP(mean): 92 (16 May 2019 16:06) (88 - 104)  RR: 16 (17 May 2019 04:59) (11 - 18)  SpO2: 99% (17 May 2019 04:59) (92% - 100%)      Dressing C/D/I    Pulses: DP/PT 2+  SLT:  intact s/s/sp/dp/t   Motor:  EHL/FHL- unable 2/2 to previous surgery TA/GS  5/5                          10.3   11.69 )-----------( 249      ( 16 May 2019 14:52 )             32.4       A/P: 83yoFemale s/p RIGHT THR  - Stable  - Pain Control  - DVT ppx: ASA, eliquis for afib (POD2 likely)   - Post op abx: ancef  - PT, WBS: PWB 50% RLE   - F/U AM Labs

## 2019-05-17 NOTE — CONSULT NOTE ADULT - SUBJECTIVE AND OBJECTIVE BOX
Patient is a 83y old  Female who presents with a chief complaint of Right hip pain (17 May 2019 11:51)      History of Present Illness:  Reason for Admission: Right hip pain	  History of Present Illness: 	  83F c/o right hip pain worsened over time without improvement. Failed conservative treatments. Denies numbness, tingling. Ambulates without assist. S/P THR Right POD1. Doing well, no current complaints.      Review of Systems:  Review of Systems: musculoskeletal: +joint pain right hip	  Other Review of Systems: All other review of systems negative, except as noted in HPI	      Allergies and Intolerances:        Allergies:  	No Known Allergies:     Home Medications:   * Patient Currently Takes Medications as of 16-May-2019 08:50 documented in Structured Notes  · 	Metoprolol Succinate ER 25 mg oral tablet, extended release: 1 tab(s) orally once a day, Last Dose Taken: 10-May-2019   · 	pantoprazole 20 mg oral delayed release tablet: 1 tab(s) orally once a day, Last Dose Taken: 10-May-2019   · 	Nasonex 50 mcg/inh nasal spray: 2 spray(s) nasal once a day, As Needed, Last Dose Taken: April, 2019   · 	Eliquis 2.5 mg oral tablet: 1 tab(s) orally 2 times a day, Last Dose Taken: 10-May-2019   · 	Singulair 10 mg oral tablet: 1 tab(s) orally once a day, Last Dose Taken: 02-May-2019   · 	hydroCHLOROthiazide 12.5 mg oral tablet: 1 tab(s) orally once a day, Last Dose Taken: 15-May-2019 AM  · 	meloxicam: 1 tab(s) orally once a day, Last Dose Taken: 06-May-2019     Patient History:    Past Medical, Past Surgical, and Family History:  PAST MEDICAL HISTORY:  Afib     Anemia     DM (diabetes mellitus) non insulin dependent as per Dr. Choudhury H&P.  pt denies    Dysuria     GERD (gastroesophageal reflux disease)     Hip pain     HTN (hypertension)     Osteoarthritis     Osteoporosis     Renal insufficiency     Rheumatoid arthritis     Seasonal allergies     Sleep apnea does not use cpap    Stress incontinence, female     UTI (urinary tract infection).     PAST SURGICAL HISTORY:  H/O total knee replacement, bilateral     History of appendectomy     History of cholecystectomy     History of nasal surgery     History of surgery b/l hand reconstruction x2  with plastic hardware b/l    History of surgery b/l feet with pins.     Social History:  Social History (marital status, living situation, occupation, tobacco use, alcohol and drug use, and sexual history): never smoker	      INTERVAL HPI/OVERNIGHT EVENTS: No acute events O/N. No complaints at this time.     Review of Systems: 12 point review of systems otherwise negative      MEDICATIONS  (STANDING):  acetaminophen   Tablet .. 650 milliGRAM(s) Oral every 6 hours  apixaban 2.5 milliGRAM(s) Oral every 12 hours  docusate sodium 100 milliGRAM(s) Oral three times a day  fluticasone propionate 50 MICROgram(s)/spray Nasal Spray 1 Spray(s) Both Nostrils daily  hydrochlorothiazide 12.5 milliGRAM(s) Oral daily  lactated ringers. 1000 milliLiter(s) (120 mL/Hr) IV Continuous <Continuous>  metoprolol succinate ER 25 milliGRAM(s) Oral daily  montelukast 10 milliGRAM(s) Oral daily  morphine  - Injectable 4 milliGRAM(s) IV Push once  pantoprazole    Tablet 40 milliGRAM(s) Oral before breakfast  polyethylene glycol 3350 17 Gram(s) Oral daily  traMADol 50 milliGRAM(s) Oral every 8 hours    MEDICATIONS  (PRN):  aluminum hydroxide/magnesium hydroxide/simethicone Suspension 30 milliLiter(s) Oral four times a day PRN Indigestion  diphenhydrAMINE 25 milliGRAM(s) Oral at bedtime PRN Insomnia  HYDROmorphone  Injectable 0.5 milliGRAM(s) IV Push every 4 hours PRN Severe Pain (7 - 10)  magnesium hydroxide Suspension 30 milliLiter(s) Oral four times a day PRN Constipation  ondansetron Injectable 4 milliGRAM(s) IV Push every 6 hours PRN Nausea and/or Vomiting  oxyCODONE    IR 5 milliGRAM(s) Oral every 4 hours PRN Mild Pain (1 - 3)  oxyCODONE    IR 10 milliGRAM(s) Oral every 4 hours PRN Moderate Pain (4 - 6)  senna 2 Tablet(s) Oral at bedtime PRN Constipation      Allergies    No Known Allergies    Intolerances          Vital Signs Last 24 Hrs  T(C): 36.3 (17 May 2019 09:30), Max: 36.3 (16 May 2019 16:06)  T(F): 97.4 (17 May 2019 09:30), Max: 97.4 (17 May 2019 01:01)  HR: 80 (17 May 2019 09:30) (76 - 96)  BP: 126/62 (17 May 2019 09:30) (122/58 - 161/70)  BP(mean): 92 (16 May 2019 16:06) (88 - 104)  RR: 18 (17 May 2019 09:30) (11 - 18)  SpO2: 99% (17 May 2019 09:30) (92% - 100%)  CAPILLARY BLOOD GLUCOSE          05-16 @ 07:01  -  05-17 @ 07:00  --------------------------------------------------------  IN: 0 mL / OUT: 900 mL / NET: -900 mL        Physical Exam:    General:  NAD  HEENT:  Nonicteric  CV:  Irregular, no murmurs  Lungs:  CTA B/L, no wheezes, rales, rhonchi  Abdomen:  Soft, non-tender  Extremities:  Right thigh dressing C/D/I with minimal tenderness, RA hand deformities, ulnar deviation  Skin:  Warm and dry, no rashes  Neuro:  Nonfocal  No Restraints    LABS:                        9.2    8.49  )-----------( 228      ( 17 May 2019 07:59 )             29.0     05-17    133<L>  |  96  |  23  ----------------------------<  136<H>  3.3<L>   |  26  |  0.90    Ca    8.4      17 May 2019 07:59

## 2019-05-17 NOTE — OCCUPATIONAL THERAPY INITIAL EVALUATION ADULT - ADDITIONAL COMMENTS
Patient lives alone in private house with elevator access. Independent  with all ADLs prior to January when pain started. Started using RW when pain started. At baseline patient has RA which affects her fine motor coordination and . Owns RW, SC, Commode.

## 2019-05-17 NOTE — OCCUPATIONAL THERAPY INITIAL EVALUATION ADULT - PLANNED THERAPY INTERVENTIONS, OT EVAL
transfer training/ADL retraining/fine motor coordination training/balance training/bed mobility training

## 2019-05-17 NOTE — DISCHARGE NOTE PROVIDER - HOSPITAL COURSE
Admitted to orthopedic service, Dr. Damon     Surgery on 5/16/19     Minnie-op Antibiotics    Pain control    DVT prophylaxis    OOB/Physical Therapy

## 2019-05-17 NOTE — DISCHARGE NOTE PROVIDER - NSDCCPCAREPLAN_GEN_ALL_CORE_FT
PRINCIPAL DISCHARGE DIAGNOSIS  Diagnosis: Primary osteoarthritis of right hip  Assessment and Plan of Treatment: Primary osteoarthritis of right hip

## 2019-05-18 LAB
ANION GAP SERPL CALC-SCNC: 8 MMOL/L — SIGNIFICANT CHANGE UP (ref 5–17)
BUN SERPL-MCNC: 29 MG/DL — HIGH (ref 7–23)
CALCIUM SERPL-MCNC: 8.6 MG/DL — SIGNIFICANT CHANGE UP (ref 8.4–10.5)
CHLORIDE SERPL-SCNC: 96 MMOL/L — SIGNIFICANT CHANGE UP (ref 96–108)
CO2 SERPL-SCNC: 29 MMOL/L — SIGNIFICANT CHANGE UP (ref 22–31)
CREAT SERPL-MCNC: 0.9 MG/DL — SIGNIFICANT CHANGE UP (ref 0.5–1.3)
GLUCOSE SERPL-MCNC: 88 MG/DL — SIGNIFICANT CHANGE UP (ref 70–99)
HBA1C BLD-MCNC: 4.8 % — SIGNIFICANT CHANGE UP (ref 4–5.6)
HCT VFR BLD CALC: 27.3 % — LOW (ref 34.5–45)
HGB BLD-MCNC: 8.7 G/DL — LOW (ref 11.5–15.5)
MCHC RBC-ENTMCNC: 29.9 PG — SIGNIFICANT CHANGE UP (ref 27–34)
MCHC RBC-ENTMCNC: 31.9 GM/DL — LOW (ref 32–36)
MCV RBC AUTO: 93.8 FL — SIGNIFICANT CHANGE UP (ref 80–100)
NRBC # BLD: 0 /100 WBCS — SIGNIFICANT CHANGE UP (ref 0–0)
PLATELET # BLD AUTO: 222 K/UL — SIGNIFICANT CHANGE UP (ref 150–400)
POTASSIUM SERPL-MCNC: 3.7 MMOL/L — SIGNIFICANT CHANGE UP (ref 3.5–5.3)
POTASSIUM SERPL-SCNC: 3.7 MMOL/L — SIGNIFICANT CHANGE UP (ref 3.5–5.3)
RBC # BLD: 2.91 M/UL — LOW (ref 3.8–5.2)
RBC # FLD: 13.4 % — SIGNIFICANT CHANGE UP (ref 10.3–14.5)
SODIUM SERPL-SCNC: 133 MMOL/L — LOW (ref 135–145)
WBC # BLD: 7.3 K/UL — SIGNIFICANT CHANGE UP (ref 3.8–10.5)
WBC # FLD AUTO: 7.3 K/UL — SIGNIFICANT CHANGE UP (ref 3.8–10.5)

## 2019-05-18 PROCEDURE — 99233 SBSQ HOSP IP/OBS HIGH 50: CPT

## 2019-05-18 RX ADMIN — PANTOPRAZOLE SODIUM 40 MILLIGRAM(S): 20 TABLET, DELAYED RELEASE ORAL at 06:17

## 2019-05-18 RX ADMIN — TRAMADOL HYDROCHLORIDE 50 MILLIGRAM(S): 50 TABLET ORAL at 06:20

## 2019-05-18 RX ADMIN — Medication 650 MILLIGRAM(S): at 00:09

## 2019-05-18 RX ADMIN — Medication 650 MILLIGRAM(S): at 06:18

## 2019-05-18 RX ADMIN — APIXABAN 2.5 MILLIGRAM(S): 2.5 TABLET, FILM COATED ORAL at 17:17

## 2019-05-18 RX ADMIN — Medication 650 MILLIGRAM(S): at 07:00

## 2019-05-18 RX ADMIN — Medication 650 MILLIGRAM(S): at 18:00

## 2019-05-18 RX ADMIN — TRAMADOL HYDROCHLORIDE 50 MILLIGRAM(S): 50 TABLET ORAL at 13:31

## 2019-05-18 RX ADMIN — Medication 20 MILLIEQUIVALENT(S): at 00:09

## 2019-05-18 RX ADMIN — TRAMADOL HYDROCHLORIDE 50 MILLIGRAM(S): 50 TABLET ORAL at 07:00

## 2019-05-18 RX ADMIN — TRAMADOL HYDROCHLORIDE 50 MILLIGRAM(S): 50 TABLET ORAL at 23:30

## 2019-05-18 RX ADMIN — CELECOXIB 200 MILLIGRAM(S): 200 CAPSULE ORAL at 07:00

## 2019-05-18 RX ADMIN — CELECOXIB 200 MILLIGRAM(S): 200 CAPSULE ORAL at 18:00

## 2019-05-18 RX ADMIN — TRAMADOL HYDROCHLORIDE 50 MILLIGRAM(S): 50 TABLET ORAL at 22:40

## 2019-05-18 RX ADMIN — Medication 12.5 MILLIGRAM(S): at 06:18

## 2019-05-18 RX ADMIN — MONTELUKAST 10 MILLIGRAM(S): 4 TABLET, CHEWABLE ORAL at 13:31

## 2019-05-18 RX ADMIN — Medication 650 MILLIGRAM(S): at 17:18

## 2019-05-18 RX ADMIN — APIXABAN 2.5 MILLIGRAM(S): 2.5 TABLET, FILM COATED ORAL at 06:17

## 2019-05-18 RX ADMIN — Medication 25 MILLIGRAM(S): at 06:18

## 2019-05-18 RX ADMIN — CELECOXIB 200 MILLIGRAM(S): 200 CAPSULE ORAL at 17:21

## 2019-05-18 RX ADMIN — Medication 650 MILLIGRAM(S): at 00:45

## 2019-05-18 RX ADMIN — CELECOXIB 200 MILLIGRAM(S): 200 CAPSULE ORAL at 06:17

## 2019-05-18 RX ADMIN — TRAMADOL HYDROCHLORIDE 50 MILLIGRAM(S): 50 TABLET ORAL at 14:00

## 2019-05-18 RX ADMIN — Medication 100 MILLIGRAM(S): at 06:17

## 2019-05-18 NOTE — PROGRESS NOTE ADULT - SUBJECTIVE AND OBJECTIVE BOX
Ortho Note    Pt comfortable without complaints, progressing with PT, pain controlled  Denies CP, SOB, N/V, numbness/tingling     Vital Signs Last 24 Hrs  T(C): 36 (05-18-19 @ 08:14), Max: 36 (05-18-19 @ 08:14)  T(F): 96.8 (05-18-19 @ 08:14), Max: 96.8 (05-18-19 @ 08:14)  HR: 78 (05-18-19 @ 08:14) (69 - 78)  BP: 159/65 (05-18-19 @ 08:14) (156/65 - 159/65)  BP(mean): --  RR: 17 (05-18-19 @ 08:14) (17 - 17)  SpO2: 100% (05-18-19 @ 08:14) (100% - 100%)    Dressing C/D/I  Pulses: DP/PT 2+  SLT:  intact s/s/sp/dp/t   Motor:  EHL/FHL- unable 2/2 to previous surgery TA/GS  5/5                          8.7    7.30  )-----------( 222      ( 18 May 2019 07:43 )             27.3   18 May 2019 07:43    133    |  96     |  29     ----------------------------<  88     3.7     |  29     |  0.90     Ca    8.6        18 May 2019 07:43        A/P: 83yoFemale s/p RIGHT THR  - Stable  - Pain Control  - DVT ppx: ASA, eliquis for afib  - PT, WBS: PWB 50% RLE   - dispo rehab     Ortho Pager [phone]

## 2019-05-18 NOTE — PROGRESS NOTE ADULT - SUBJECTIVE AND OBJECTIVE BOX
Patient is a 83y old  Female who presents with a chief complaint of Right hip pain (18 May 2019 09:03)      INTERVAL HPI/OVERNIGHT EVENTS: Pt seen and examined at bedside. Multiple BMs this morning, no longer constipated.           ROS  (- ) headache  ( -  )fevers/chills  ( - ) dyspnea  (  - ) cough  (  - ) chest pain  (  - ) palpatations  ( - ) dizziness/lightheadedness  (  - ) nausea/vomiting  (  - ) abd pain  (  - ) diarrhea  (  - ) melena  (  - ) hematochezia  (  - ) dysuria   ( - ) hematuria  (  - ) leg swelling    ( - ) calf tenderness  (  - ) motor weakness  ( - ) extremity numbness  ( - ) back pain  ( + ) tolerating POs  ( + ) BM  ROS: 12 point review of systems otherwise negative              MEDICATIONS  (STANDING):  acetaminophen   Tablet .. 650 milliGRAM(s) Oral every 6 hours  apixaban 2.5 milliGRAM(s) Oral every 12 hours  celecoxib 200 milliGRAM(s) Oral two times a day  dextrose 5%. 1000 milliLiter(s) (50 mL/Hr) IV Continuous <Continuous>  dextrose 50% Injectable 12.5 Gram(s) IV Push once  dextrose 50% Injectable 25 Gram(s) IV Push once  dextrose 50% Injectable 25 Gram(s) IV Push once  docusate sodium 100 milliGRAM(s) Oral three times a day  fluticasone propionate 50 MICROgram(s)/spray Nasal Spray 1 Spray(s) Both Nostrils daily  hydrochlorothiazide 12.5 milliGRAM(s) Oral daily  lactated ringers. 1000 milliLiter(s) (120 mL/Hr) IV Continuous <Continuous>  metoprolol succinate ER 25 milliGRAM(s) Oral daily  montelukast 10 milliGRAM(s) Oral daily  morphine  - Injectable 4 milliGRAM(s) IV Push once  pantoprazole    Tablet 40 milliGRAM(s) Oral before breakfast  polyethylene glycol 3350 17 Gram(s) Oral daily  traMADol 50 milliGRAM(s) Oral every 8 hours    MEDICATIONS  (PRN):  aluminum hydroxide/magnesium hydroxide/simethicone Suspension 30 milliLiter(s) Oral four times a day PRN Indigestion  bisacodyl Suppository 10 milliGRAM(s) Rectal daily PRN If no bowel movement by POD#2  dextrose 40% Gel 15 Gram(s) Oral once PRN Blood Glucose LESS THAN 70 milliGRAM(s)/deciliter  diphenhydrAMINE 25 milliGRAM(s) Oral at bedtime PRN Insomnia  glucagon  Injectable 1 milliGRAM(s) IntraMuscular once PRN Glucose LESS THAN 70 milligrams/deciliter  HYDROmorphone  Injectable 0.5 milliGRAM(s) IV Push every 4 hours PRN Severe Pain (7 - 10)  magnesium hydroxide Suspension 30 milliLiter(s) Oral four times a day PRN Constipation  ondansetron Injectable 4 milliGRAM(s) IV Push every 6 hours PRN Nausea and/or Vomiting  oxyCODONE    IR 5 milliGRAM(s) Oral every 4 hours PRN Mild Pain (1 - 3)  oxyCODONE    IR 10 milliGRAM(s) Oral every 4 hours PRN Moderate Pain (4 - 6)  senna 2 Tablet(s) Oral at bedtime PRN Constipation      Allergies    No Known Allergies    Intolerances          Vital Signs Last 24 Hrs  T(C): 37 (18 May 2019 13:53), Max: 37 (18 May 2019 13:53)  T(F): 98.6 (18 May 2019 13:53), Max: 98.6 (18 May 2019 13:53)  HR: 72 (18 May 2019 13:53) (69 - 78)  BP: 140/63 (18 May 2019 13:53) (116/73 - 159/65)  BP(mean): --  RR: 17 (18 May 2019 13:53) (16 - 18)  SpO2: 100% (18 May 2019 13:53) (100% - 100%)  CAPILLARY BLOOD GLUCOSE          05-17 @ 07:01  -  05-18 @ 07:00  --------------------------------------------------------  IN: 400 mL / OUT: 850 mL / NET: -450 mL        Physical Exam:    General:  Well appearing   HEENT:  Nonicteric, PERRLA, oral pharynx w/o erythema/exudate,  neck supple  CV:  [id number] , irregular,  no JVD  Lungs:  CTA B/L, no wheezes, rales, rhonchi  Abdomen:  positive BS, Soft, non-tender, non distended, no hepatosplenomegaly  Extremities: Right thigh dressing C/D/I with minimal tenderness, RA hand deformities, ulnar deviation  Back: no cva or midline tenderness  Skin:  Warm and dry   :  No alcazar  Neuro:  AAOx3,  CN II-XII grossly intact, 5/5 str all 4 ext, sensation intact, (-) dysmetria b/l (-) dysdiadochokinesia bl  No Restraints    LABS:                        8.7    7.30  )-----------( 222      ( 18 May 2019 07:43 )             27.3     05-18    133<L>  |  96  |  29<H>  ----------------------------<  88  3.7   |  29  |  0.90    Ca    8.6      18 May 2019 07:43              RADIOLOGY & ADDITIONAL TESTS: reviewed

## 2019-05-18 NOTE — PROGRESS NOTE ADULT - ASSESSMENT
82 y/o F with RA, Afib, DM, HTN, GERD who presents for Right THR:    1) Post-op state Right THR  * OOB-C  * Physical therapy evaluation  * Aggressive incentive spirometry  * Pain control and bowel regimen  * Mechanical LE ppx     2) Afib: Cont eliquis and metoprolol    3) Essential HTN: Controlled, cont current regimen    4) Constipation: now resolved.     5) GERD: Cont PPI    6) Anemia: Monitoring CBC daily, no signs of bleeding    7) Hyponatremia: Still Na 133, likely hypovolemia. Continue to trend.

## 2019-05-19 LAB
ANION GAP SERPL CALC-SCNC: 9 MMOL/L — SIGNIFICANT CHANGE UP (ref 5–17)
BUN SERPL-MCNC: 27 MG/DL — HIGH (ref 7–23)
CALCIUM SERPL-MCNC: 8.4 MG/DL — SIGNIFICANT CHANGE UP (ref 8.4–10.5)
CHLORIDE SERPL-SCNC: 100 MMOL/L — SIGNIFICANT CHANGE UP (ref 96–108)
CO2 SERPL-SCNC: 30 MMOL/L — SIGNIFICANT CHANGE UP (ref 22–31)
CREAT SERPL-MCNC: 0.9 MG/DL — SIGNIFICANT CHANGE UP (ref 0.5–1.3)
GLUCOSE SERPL-MCNC: 108 MG/DL — HIGH (ref 70–99)
HCT VFR BLD CALC: 29 % — LOW (ref 34.5–45)
HGB BLD-MCNC: 9.1 G/DL — LOW (ref 11.5–15.5)
MCHC RBC-ENTMCNC: 29.8 PG — SIGNIFICANT CHANGE UP (ref 27–34)
MCHC RBC-ENTMCNC: 31.4 GM/DL — LOW (ref 32–36)
MCV RBC AUTO: 95.1 FL — SIGNIFICANT CHANGE UP (ref 80–100)
NRBC # BLD: 0 /100 WBCS — SIGNIFICANT CHANGE UP (ref 0–0)
PLATELET # BLD AUTO: 269 K/UL — SIGNIFICANT CHANGE UP (ref 150–400)
POTASSIUM SERPL-MCNC: 3.4 MMOL/L — LOW (ref 3.5–5.3)
POTASSIUM SERPL-SCNC: 3.4 MMOL/L — LOW (ref 3.5–5.3)
RBC # BLD: 3.05 M/UL — LOW (ref 3.8–5.2)
RBC # FLD: 13.8 % — SIGNIFICANT CHANGE UP (ref 10.3–14.5)
SODIUM SERPL-SCNC: 139 MMOL/L — SIGNIFICANT CHANGE UP (ref 135–145)
WBC # BLD: 6.46 K/UL — SIGNIFICANT CHANGE UP (ref 3.8–10.5)
WBC # FLD AUTO: 6.46 K/UL — SIGNIFICANT CHANGE UP (ref 3.8–10.5)

## 2019-05-19 PROCEDURE — 99233 SBSQ HOSP IP/OBS HIGH 50: CPT

## 2019-05-19 RX ADMIN — APIXABAN 2.5 MILLIGRAM(S): 2.5 TABLET, FILM COATED ORAL at 05:26

## 2019-05-19 RX ADMIN — TRAMADOL HYDROCHLORIDE 50 MILLIGRAM(S): 50 TABLET ORAL at 22:15

## 2019-05-19 RX ADMIN — TRAMADOL HYDROCHLORIDE 50 MILLIGRAM(S): 50 TABLET ORAL at 05:27

## 2019-05-19 RX ADMIN — Medication 650 MILLIGRAM(S): at 14:05

## 2019-05-19 RX ADMIN — CELECOXIB 200 MILLIGRAM(S): 200 CAPSULE ORAL at 18:22

## 2019-05-19 RX ADMIN — Medication 100 MILLIGRAM(S): at 05:26

## 2019-05-19 RX ADMIN — TRAMADOL HYDROCHLORIDE 50 MILLIGRAM(S): 50 TABLET ORAL at 14:05

## 2019-05-19 RX ADMIN — APIXABAN 2.5 MILLIGRAM(S): 2.5 TABLET, FILM COATED ORAL at 18:23

## 2019-05-19 RX ADMIN — Medication 650 MILLIGRAM(S): at 13:33

## 2019-05-19 RX ADMIN — CELECOXIB 200 MILLIGRAM(S): 200 CAPSULE ORAL at 05:27

## 2019-05-19 RX ADMIN — TRAMADOL HYDROCHLORIDE 50 MILLIGRAM(S): 50 TABLET ORAL at 13:31

## 2019-05-19 RX ADMIN — CELECOXIB 200 MILLIGRAM(S): 200 CAPSULE ORAL at 06:39

## 2019-05-19 RX ADMIN — Medication 25 MILLIGRAM(S): at 05:26

## 2019-05-19 RX ADMIN — PANTOPRAZOLE SODIUM 40 MILLIGRAM(S): 20 TABLET, DELAYED RELEASE ORAL at 05:27

## 2019-05-19 RX ADMIN — Medication 12.5 MILLIGRAM(S): at 05:27

## 2019-05-19 RX ADMIN — Medication 650 MILLIGRAM(S): at 05:27

## 2019-05-19 RX ADMIN — TRAMADOL HYDROCHLORIDE 50 MILLIGRAM(S): 50 TABLET ORAL at 22:47

## 2019-05-19 RX ADMIN — MONTELUKAST 10 MILLIGRAM(S): 4 TABLET, CHEWABLE ORAL at 13:31

## 2019-05-19 RX ADMIN — Medication 650 MILLIGRAM(S): at 06:39

## 2019-05-19 RX ADMIN — TRAMADOL HYDROCHLORIDE 50 MILLIGRAM(S): 50 TABLET ORAL at 06:39

## 2019-05-19 NOTE — PROGRESS NOTE ADULT - SUBJECTIVE AND OBJECTIVE BOX
Patient is a 83y old  Female who presents with a chief complaint of Right hip pain (19 May 2019 07:42)      INTERVAL HPI/OVERNIGHT EVENTS: Pt seen and examined at bedside. No acute events. No complaints this morning. Good appetite, had multiple BMs yesterday, not feeling constipated any longer.      ROS  (- ) headache  ( -  )fevers/chills  ( - ) dyspnea  (  - ) cough  (  - ) chest pain  (  - ) palpatations  ( - ) dizziness/lightheadedness  (  - ) nausea/vomiting  (  - ) abd pain  (  - ) diarrhea  (  - ) melena  (  - ) hematochezia  (  - ) dysuria   ( - ) hematuria  (  - ) leg swelling    ( - ) calf tenderness  (  - ) motor weakness  ( - ) extremity numbness  ( - ) back pain  ( + ) tolerating POs  ( + ) BM  ROS: 12 point review of systems otherwise negative              MEDICATIONS  (STANDING):  apixaban 2.5 milliGRAM(s) Oral every 12 hours  celecoxib 200 milliGRAM(s) Oral two times a day  dextrose 5%. 1000 milliLiter(s) (50 mL/Hr) IV Continuous <Continuous>  dextrose 50% Injectable 12.5 Gram(s) IV Push once  dextrose 50% Injectable 25 Gram(s) IV Push once  dextrose 50% Injectable 25 Gram(s) IV Push once  docusate sodium 100 milliGRAM(s) Oral three times a day  fluticasone propionate 50 MICROgram(s)/spray Nasal Spray 1 Spray(s) Both Nostrils daily  hydrochlorothiazide 12.5 milliGRAM(s) Oral daily  lactated ringers. 1000 milliLiter(s) (120 mL/Hr) IV Continuous <Continuous>  metoprolol succinate ER 25 milliGRAM(s) Oral daily  montelukast 10 milliGRAM(s) Oral daily  morphine  - Injectable 4 milliGRAM(s) IV Push once  pantoprazole    Tablet 40 milliGRAM(s) Oral before breakfast  polyethylene glycol 3350 17 Gram(s) Oral daily  traMADol 50 milliGRAM(s) Oral every 8 hours    MEDICATIONS  (PRN):  aluminum hydroxide/magnesium hydroxide/simethicone Suspension 30 milliLiter(s) Oral four times a day PRN Indigestion  bisacodyl Suppository 10 milliGRAM(s) Rectal daily PRN If no bowel movement by POD#2  dextrose 40% Gel 15 Gram(s) Oral once PRN Blood Glucose LESS THAN 70 milliGRAM(s)/deciliter  diphenhydrAMINE 25 milliGRAM(s) Oral at bedtime PRN Insomnia  glucagon  Injectable 1 milliGRAM(s) IntraMuscular once PRN Glucose LESS THAN 70 milligrams/deciliter  HYDROmorphone  Injectable 0.5 milliGRAM(s) IV Push every 4 hours PRN Severe Pain (7 - 10)  magnesium hydroxide Suspension 30 milliLiter(s) Oral four times a day PRN Constipation  ondansetron Injectable 4 milliGRAM(s) IV Push every 6 hours PRN Nausea and/or Vomiting  oxyCODONE    IR 5 milliGRAM(s) Oral every 4 hours PRN Mild Pain (1 - 3)  oxyCODONE    IR 10 milliGRAM(s) Oral every 4 hours PRN Moderate Pain (4 - 6)  senna 2 Tablet(s) Oral at bedtime PRN Constipation      Allergies    No Known Allergies    Intolerances          Vital Signs Last 24 Hrs  T(C): 37.3 (19 May 2019 13:23), Max: 37.3 (19 May 2019 13:23)  T(F): 99.1 (19 May 2019 13:23), Max: 99.1 (19 May 2019 13:23)  HR: 78 (19 May 2019 13:23) (64 - 87)  BP: 120/58 (19 May 2019 13:23) (100/63 - 161/68)  BP(mean): --  RR: 17 (19 May 2019 13:23) (15 - 18)  SpO2: 100% (19 May 2019 13:23) (94% - 100%)  CAPILLARY BLOOD GLUCOSE          05-18 @ 07:01 - 05-19 @ 07:00  --------------------------------------------------------  IN: 0 mL / OUT: 700 mL / NET: -700 mL    05-19 @ 07:01 - 05-19 @ 14:28  --------------------------------------------------------  IN: 0 mL / OUT: 501 mL / NET: -501 mL        Physical Exam:    General:  Well appearing   HEENT:  Nonicteric, PERRLA, oral pharynx w/o erythema/exudate,  neck supple  CV:  X7X5set , irregular,  no JVD  Lungs:  CTA B/L, no wheezes, rales, rhonchi  Abdomen:  positive BS, Soft, non-tender, non distended, no hepatosplenomegaly  Extremities: Right thigh dressing C/D/I with minimal tenderness, RA hand deformities, ulnar deviation  Back: no cva or midline tenderness  Skin:  Warm and dry   :  No alcazar  Neuro:  AAOx3,  CN II-XII grossly intact, 5/5 str all 4 ext, sensation intact, (-) dysmetria b/l (-) dysdiadochokinesia bl  No Restraints    LABS:                        9.1    6.46  )-----------( 269      ( 19 May 2019 10:44 )             29.0     05-19    139  |  100  |  27<H>  ----------------------------<  108<H>  3.4<L>   |  30  |  0.90    Ca    8.4      19 May 2019 10:44      RADIOLOGY & ADDITIONAL TESTS: reviewed

## 2019-05-19 NOTE — PROGRESS NOTE ADULT - SUBJECTIVE AND OBJECTIVE BOX
Ortho Note    Pt comfortable without complaints, pain controlled  Denies CP, SOB, N/V, numbness/tingling     Vital Signs Last 24 Hrs  T(C): --  T(F): --  HR: 85 (05-19-19 @ 06:48) (85 - 85)  BP: --  BP(mean): --  RR: 16 (05-19-19 @ 06:48) (16 - 16)  SpO2: 99% (05-19-19 @ 06:48) (99% - 99%)    Dressing C/D/I  Pulses: DP/PT 2+  SLT:  intact s/s/sp/dp/t   Motor:  EHL/FHL- unable 2/2 to previous surgery TA/GS  5/5                          8.7    7.30  )-----------( 222      ( 18 May 2019 07:43 )             27.3   18 May 2019 07:43    133    |  96     |  29     ----------------------------<  88     3.7     |  29     |  0.90           A/P: 83yoFemale s/p RIGHT THR  - Stable  - Pain Control  - DVT ppx: ASA, eliquis for afib  - PT, WBS: PWB 50% RLE   - dispo rehab monday     Ortho Pager [phone]

## 2019-05-19 NOTE — PROGRESS NOTE ADULT - ASSESSMENT
84 y/o F with RA, Afib, DM, HTN, GERD who presents for Right THR.    1) Post-op state Right THR  * OOB-C  * Physical therapy evaluation  * Aggressive incentive spirometry  * Pain control and bowel regimen  * Mechanical LE ppx     2) Afib: Cont eliquis and metoprolol    3) Essential HTN: Controlled, cont current regimen    4) Constipation: now resolved.     5) GERD: Cont PPI    6) Anemia: Monitoring CBC daily, no signs of bleeding    7) Hyponatremia: now 139, resolved.

## 2019-05-20 ENCOUNTER — TRANSCRIPTION ENCOUNTER (OUTPATIENT)
Age: 84
End: 2019-05-20

## 2019-05-20 VITALS
SYSTOLIC BLOOD PRESSURE: 153 MMHG | OXYGEN SATURATION: 100 % | DIASTOLIC BLOOD PRESSURE: 74 MMHG | HEART RATE: 79 BPM | RESPIRATION RATE: 17 BRPM | TEMPERATURE: 96 F

## 2019-05-20 LAB
ANION GAP SERPL CALC-SCNC: 10 MMOL/L — SIGNIFICANT CHANGE UP (ref 5–17)
APPEARANCE UR: CLEAR — SIGNIFICANT CHANGE UP
BILIRUB UR-MCNC: NEGATIVE — SIGNIFICANT CHANGE UP
BUN SERPL-MCNC: 20 MG/DL — SIGNIFICANT CHANGE UP (ref 7–23)
CALCIUM SERPL-MCNC: 8.9 MG/DL — SIGNIFICANT CHANGE UP (ref 8.4–10.5)
CHLORIDE SERPL-SCNC: 97 MMOL/L — SIGNIFICANT CHANGE UP (ref 96–108)
CO2 SERPL-SCNC: 31 MMOL/L — SIGNIFICANT CHANGE UP (ref 22–31)
COLOR SPEC: YELLOW — SIGNIFICANT CHANGE UP
CREAT SERPL-MCNC: 0.62 MG/DL — SIGNIFICANT CHANGE UP (ref 0.5–1.3)
DIFF PNL FLD: NEGATIVE — SIGNIFICANT CHANGE UP
GLUCOSE SERPL-MCNC: 86 MG/DL — SIGNIFICANT CHANGE UP (ref 70–99)
GLUCOSE UR QL: NEGATIVE — SIGNIFICANT CHANGE UP
HCT VFR BLD CALC: 31.6 % — LOW (ref 34.5–45)
HGB BLD-MCNC: 10.1 G/DL — LOW (ref 11.5–15.5)
KETONES UR-MCNC: NEGATIVE — SIGNIFICANT CHANGE UP
LEUKOCYTE ESTERASE UR-ACNC: ABNORMAL
MCHC RBC-ENTMCNC: 29.6 PG — SIGNIFICANT CHANGE UP (ref 27–34)
MCHC RBC-ENTMCNC: 32 GM/DL — SIGNIFICANT CHANGE UP (ref 32–36)
MCV RBC AUTO: 92.7 FL — SIGNIFICANT CHANGE UP (ref 80–100)
NITRITE UR-MCNC: NEGATIVE — SIGNIFICANT CHANGE UP
NRBC # BLD: 0 /100 WBCS — SIGNIFICANT CHANGE UP (ref 0–0)
PH UR: 7 — SIGNIFICANT CHANGE UP (ref 5–8)
PLATELET # BLD AUTO: 298 K/UL — SIGNIFICANT CHANGE UP (ref 150–400)
POTASSIUM SERPL-MCNC: 3.4 MMOL/L — LOW (ref 3.5–5.3)
POTASSIUM SERPL-SCNC: 3.4 MMOL/L — LOW (ref 3.5–5.3)
PROT UR-MCNC: NEGATIVE MG/DL — SIGNIFICANT CHANGE UP
RBC # BLD: 3.41 M/UL — LOW (ref 3.8–5.2)
RBC # FLD: 13.7 % — SIGNIFICANT CHANGE UP (ref 10.3–14.5)
SODIUM SERPL-SCNC: 138 MMOL/L — SIGNIFICANT CHANGE UP (ref 135–145)
SP GR SPEC: <=1.005 — SIGNIFICANT CHANGE UP (ref 1–1.03)
UROBILINOGEN FLD QL: 0.2 E.U./DL — SIGNIFICANT CHANGE UP
WBC # BLD: 6.37 K/UL — SIGNIFICANT CHANGE UP (ref 3.8–10.5)
WBC # FLD AUTO: 6.37 K/UL — SIGNIFICANT CHANGE UP (ref 3.8–10.5)

## 2019-05-20 PROCEDURE — 82962 GLUCOSE BLOOD TEST: CPT

## 2019-05-20 PROCEDURE — 83036 HEMOGLOBIN GLYCOSYLATED A1C: CPT

## 2019-05-20 PROCEDURE — 94660 CPAP INITIATION&MGMT: CPT

## 2019-05-20 PROCEDURE — 87641 MR-STAPH DNA AMP PROBE: CPT

## 2019-05-20 PROCEDURE — 72170 X-RAY EXAM OF PELVIS: CPT

## 2019-05-20 PROCEDURE — 97530 THERAPEUTIC ACTIVITIES: CPT

## 2019-05-20 PROCEDURE — 94640 AIRWAY INHALATION TREATMENT: CPT

## 2019-05-20 PROCEDURE — C1713: CPT

## 2019-05-20 PROCEDURE — 97162 PT EVAL MOD COMPLEX 30 MIN: CPT

## 2019-05-20 PROCEDURE — C1776: CPT

## 2019-05-20 PROCEDURE — 85027 COMPLETE CBC AUTOMATED: CPT

## 2019-05-20 PROCEDURE — 86900 BLOOD TYPING SEROLOGIC ABO: CPT

## 2019-05-20 PROCEDURE — 80048 BASIC METABOLIC PNL TOTAL CA: CPT

## 2019-05-20 PROCEDURE — 81001 URINALYSIS AUTO W/SCOPE: CPT

## 2019-05-20 PROCEDURE — 97161 PT EVAL LOW COMPLEX 20 MIN: CPT

## 2019-05-20 PROCEDURE — 86901 BLOOD TYPING SEROLOGIC RH(D): CPT

## 2019-05-20 PROCEDURE — 97535 SELF CARE MNGMENT TRAINING: CPT

## 2019-05-20 PROCEDURE — 99233 SBSQ HOSP IP/OBS HIGH 50: CPT

## 2019-05-20 PROCEDURE — 86850 RBC ANTIBODY SCREEN: CPT

## 2019-05-20 PROCEDURE — 36415 COLL VENOUS BLD VENIPUNCTURE: CPT

## 2019-05-20 PROCEDURE — 97116 GAIT TRAINING THERAPY: CPT

## 2019-05-20 RX ORDER — POTASSIUM CHLORIDE 20 MEQ
40 PACKET (EA) ORAL ONCE
Refills: 0 | Status: COMPLETED | OUTPATIENT
Start: 2019-05-20 | End: 2019-05-20

## 2019-05-20 RX ORDER — TRAMADOL HYDROCHLORIDE 50 MG/1
1 TABLET ORAL
Qty: 0 | Refills: 0 | DISCHARGE
Start: 2019-05-20

## 2019-05-20 RX ORDER — CELECOXIB 200 MG/1
1 CAPSULE ORAL
Qty: 0 | Refills: 0 | DISCHARGE
Start: 2019-05-20

## 2019-05-20 RX ORDER — POLYETHYLENE GLYCOL 3350 17 G/17G
17 POWDER, FOR SOLUTION ORAL
Qty: 0 | Refills: 0 | DISCHARGE
Start: 2019-05-20

## 2019-05-20 RX ORDER — AMLODIPINE BESYLATE 2.5 MG/1
1 TABLET ORAL
Qty: 0 | Refills: 0 | DISCHARGE
Start: 2019-05-20

## 2019-05-20 RX ORDER — DOCUSATE SODIUM 100 MG
1 CAPSULE ORAL
Qty: 0 | Refills: 0 | DISCHARGE
Start: 2019-05-20

## 2019-05-20 RX ORDER — MELOXICAM 15 MG/1
1 TABLET ORAL
Qty: 0 | Refills: 0 | DISCHARGE

## 2019-05-20 RX ORDER — HYDRALAZINE HCL 50 MG
10 TABLET ORAL ONCE
Refills: 0 | Status: COMPLETED | OUTPATIENT
Start: 2019-05-20 | End: 2019-05-20

## 2019-05-20 RX ORDER — AMLODIPINE BESYLATE 2.5 MG/1
5 TABLET ORAL DAILY
Refills: 0 | Status: DISCONTINUED | OUTPATIENT
Start: 2019-05-20 | End: 2019-05-20

## 2019-05-20 RX ADMIN — TRAMADOL HYDROCHLORIDE 50 MILLIGRAM(S): 50 TABLET ORAL at 13:36

## 2019-05-20 RX ADMIN — Medication 10 MILLIGRAM(S): at 02:23

## 2019-05-20 RX ADMIN — OXYCODONE HYDROCHLORIDE 10 MILLIGRAM(S): 5 TABLET ORAL at 01:13

## 2019-05-20 RX ADMIN — Medication 100 MILLIGRAM(S): at 12:36

## 2019-05-20 RX ADMIN — CELECOXIB 200 MILLIGRAM(S): 200 CAPSULE ORAL at 05:20

## 2019-05-20 RX ADMIN — AMLODIPINE BESYLATE 5 MILLIGRAM(S): 2.5 TABLET ORAL at 12:36

## 2019-05-20 RX ADMIN — Medication 10 MILLIGRAM(S): at 06:14

## 2019-05-20 RX ADMIN — MONTELUKAST 10 MILLIGRAM(S): 4 TABLET, CHEWABLE ORAL at 12:36

## 2019-05-20 RX ADMIN — OXYCODONE HYDROCHLORIDE 10 MILLIGRAM(S): 5 TABLET ORAL at 02:21

## 2019-05-20 RX ADMIN — Medication 25 MILLIGRAM(S): at 04:50

## 2019-05-20 RX ADMIN — Medication 12.5 MILLIGRAM(S): at 04:50

## 2019-05-20 RX ADMIN — CELECOXIB 200 MILLIGRAM(S): 200 CAPSULE ORAL at 06:00

## 2019-05-20 RX ADMIN — POLYETHYLENE GLYCOL 3350 17 GRAM(S): 17 POWDER, FOR SOLUTION ORAL at 12:36

## 2019-05-20 RX ADMIN — APIXABAN 2.5 MILLIGRAM(S): 2.5 TABLET, FILM COATED ORAL at 05:50

## 2019-05-20 RX ADMIN — Medication 40 MILLIEQUIVALENT(S): at 12:36

## 2019-05-20 RX ADMIN — PANTOPRAZOLE SODIUM 40 MILLIGRAM(S): 20 TABLET, DELAYED RELEASE ORAL at 05:20

## 2019-05-20 RX ADMIN — TRAMADOL HYDROCHLORIDE 50 MILLIGRAM(S): 50 TABLET ORAL at 14:25

## 2019-05-20 RX ADMIN — TRAMADOL HYDROCHLORIDE 50 MILLIGRAM(S): 50 TABLET ORAL at 06:00

## 2019-05-20 RX ADMIN — TRAMADOL HYDROCHLORIDE 50 MILLIGRAM(S): 50 TABLET ORAL at 05:21

## 2019-05-20 NOTE — PROGRESS NOTE ADULT - REASON FOR ADMISSION
Right hip pain

## 2019-05-20 NOTE — PROGRESS NOTE ADULT - ASSESSMENT
82 y/o F with RA, Afib, DM, HTN, GERD who presents for Right THR.    1) Post-op state Right THR  * OOB-C  * Physical therapy evaluation  * Aggressive incentive spirometry  * Pain control and bowel regimen  * Mechanical LE ppx     2) Afib, stable  * Cont eliquis 2.5 bid  * c/w metoprolol    3) Essential HTN: uncontrolled.   * Start Amlodipine 5mg po daily, first dose now.     4) Constipation: now resolved.     5) GERD: Cont PPI    6) VTEppx  * on full AC

## 2019-05-20 NOTE — PROGRESS NOTE ADULT - SUBJECTIVE AND OBJECTIVE BOX
Orthopaedic Surgery Progress Note    Patient seen and examined. JULIO CESAR. Patient without complaints, expressed concern regarding elevated BP overnight which is currently resolved and was discussed with patient. Pain controlled. Denies CP, SOB, N/V, tactile fevers, calf pain.      Vital Signs Last 24 Hrs  T(C): 36.2 (20 May 2019 00:32), Max: 37.3 (19 May 2019 13:23)  T(F): 97.1 (20 May 2019 00:32), Max: 99.1 (19 May 2019 13:23)  HR: 88 (20 May 2019 10:07) (71 - 92)  BP: 146/78 (20 May 2019 10:07) (120/58 - 208/78)  BP(mean): --  RR: 17 (20 May 2019 10:07) (15 - 18)  SpO2: 98% (20 May 2019 10:07) (95% - 100%)    Physical Exam:  Pt laying comfortably in bed, NAD.  Skin warm and well perfused, no visible erythema/ecchymoses.  Dressing C/D/I  EHL/FHL 5/5 motor strength TA/GS not moving 2/2 previous surgery   SLT in tact and equal to distal bilateral lower extremities   DP/PT pulses 2+  Calves soft and nontender to palpation     LABS                        10.1   6.37  )-----------( 298      ( 20 May 2019 06:32 )             31.6                                05-20    138  |  97  |  20  ----------------------------<  86  3.4<L>   |  31  |  0.62    Ca    8.9      20 May 2019 06:32          A/P: 83F POD #4 s/p right THR     CONTINUE:        1. PT: 50% PWB RLE   2. DVT prophylaxis: ASA 81 BID   3. Pain Control as needed   4. Dispo: Rehab Auth Pending

## 2019-05-20 NOTE — DISCHARGE NOTE NURSING/CASE MANAGEMENT/SOCIAL WORK - NSDCDPATPORTLINK_GEN_ALL_CORE
You can access the [facility] Patient Portal, offered by [facility], by registering with the following website: [email or web address]

## 2019-05-20 NOTE — PROGRESS NOTE ADULT - SUBJECTIVE AND OBJECTIVE BOX
CC: Feeling well. No complaints.   Denies cp, sob, dizziness.   Overnight events noted ~elevated BP.   Rest of ROS negative.     Vital Signs Last 24 Hrs  T(C): 36.7 (20 May 2019 10:07), Max: 37.3 (19 May 2019 13:23)  T(F): 98 (20 May 2019 10:07), Max: 99.1 (19 May 2019 13:23)  HR: 88 (20 May 2019 10:07) (71 - 92)  BP: 146/78 (20 May 2019 10:07) (120/58 - 208/78)  BP(mean): --  RR: 17 (20 May 2019 10:07) (15 - 18)  SpO2: 98% (20 May 2019 10:07) (95% - 100%)    PHYSICAL EXAMINATION  * General: Not in acute distress. Awake and alert. Lying comfortably in bed.  * Head: Normocephalic, atraumatic.  * HEENT: ears no discharge, eyes PERRLA, nose no discharge, throat no exudates, normal tonsils.  * Neck: no JVD, supple.  * Lungs: Clear to auscultation, no rales, no wheezes.  * Cardio: Regular rate and rhythm, no murmurs, no rubs, no gallops. Good peripheral pulses.  * Abdomen: Soft, non-tender, non-distended, tympanic to percussion, no rebound, no guarding, no rigidity. Bowel sounds present. No suprapubic or CVA tenderness.  * Extremities: Acyanotic, no edema.  * Skin: Warm and dry.  * Neuro: Alert and oriented x 3. No focal deficits. Motor strength is 5/5 throughout. Sensation intact. Cranial nerves II-XII grossly intact.                           10.1   6.37  )-----------( 298      ( 20 May 2019 06:32 )             31.6   05-20    138  |  97  |  20  ----------------------------<  86  3.4<L>   |  31  |  0.62    Ca    8.9      20 May 2019 06:32    MEDICATIONS  (STANDING):  amLODIPine   Tablet 5 milliGRAM(s) Oral daily  apixaban 2.5 milliGRAM(s) Oral every 12 hours  celecoxib 200 milliGRAM(s) Oral two times a day  dextrose 5%. 1000 milliLiter(s) (50 mL/Hr) IV Continuous <Continuous>  dextrose 50% Injectable 12.5 Gram(s) IV Push once  dextrose 50% Injectable 25 Gram(s) IV Push once  dextrose 50% Injectable 25 Gram(s) IV Push once  docusate sodium 100 milliGRAM(s) Oral three times a day  fluticasone propionate 50 MICROgram(s)/spray Nasal Spray 1 Spray(s) Both Nostrils daily  hydrochlorothiazide 12.5 milliGRAM(s) Oral daily  lactated ringers. 1000 milliLiter(s) (120 mL/Hr) IV Continuous <Continuous>  metoprolol succinate ER 25 milliGRAM(s) Oral daily  montelukast 10 milliGRAM(s) Oral daily  morphine  - Injectable 4 milliGRAM(s) IV Push once  pantoprazole    Tablet 40 milliGRAM(s) Oral before breakfast  polyethylene glycol 3350 17 Gram(s) Oral daily  traMADol 50 milliGRAM(s) Oral every 8 hours    MEDICATIONS  (PRN):  aluminum hydroxide/magnesium hydroxide/simethicone Suspension 30 milliLiter(s) Oral four times a day PRN Indigestion  bisacodyl Suppository 10 milliGRAM(s) Rectal daily PRN If no bowel movement by POD#2  dextrose 40% Gel 15 Gram(s) Oral once PRN Blood Glucose LESS THAN 70 milliGRAM(s)/deciliter  diphenhydrAMINE 25 milliGRAM(s) Oral at bedtime PRN Insomnia  glucagon  Injectable 1 milliGRAM(s) IntraMuscular once PRN Glucose LESS THAN 70 milligrams/deciliter  HYDROmorphone  Injectable 0.5 milliGRAM(s) IV Push every 4 hours PRN Severe Pain (7 - 10)  magnesium hydroxide Suspension 30 milliLiter(s) Oral four times a day PRN Constipation  ondansetron Injectable 4 milliGRAM(s) IV Push every 6 hours PRN Nausea and/or Vomiting  oxyCODONE    IR 5 milliGRAM(s) Oral every 4 hours PRN Mild Pain (1 - 3)  oxyCODONE    IR 10 milliGRAM(s) Oral every 4 hours PRN Moderate Pain (4 - 6)  senna 2 Tablet(s) Oral at bedtime PRN Constipation

## 2019-05-20 NOTE — PROGRESS NOTE ADULT - SUBJECTIVE AND OBJECTIVE BOX
Ortho Note    Pt comfortable without complaints, pain controlled  Denies CP, SOB, N/V, numbness/tingling     Vital Signs Last 24 Hrs  T(C): 36.2 (20 May 2019 00:32), Max: 37.3 (19 May 2019 13:23)  T(F): 97.1 (20 May 2019 00:32), Max: 99.1 (19 May 2019 13:23)  HR: 83 (20 May 2019 05:28) (64 - 92)  BP: 190/88 (20 May 2019 04:44) (118/61 - 208/78)  BP(mean): --  RR: 15 (20 May 2019 05:28) (15 - 18)  SpO2: 100% (20 May 2019 05:28) (94% - 100%)    Dressing C/D/I  Pulses: DP/PT 2+  SLT:  intact s/s/sp/dp/t   Motor:  EHL/FHL- unable 2/2 to previous surgery TA/GS  5/5                          8.7    7.30  )-----------( 222      ( 18 May 2019 07:43 )             27.3   18 May 2019 07:43    133    |  96     |  29     ----------------------------<  88     3.7     |  29     |  0.90           A/P: 83yoFemale s/p RIGHT THR  - Stable  - Pain Control  - DVT ppx: ASA, eliquis for afib  - PT, WBS: PWB 50% RLE   - dispo rehab monday     Ortho Pager [phone]

## 2019-05-21 PROBLEM — I48.91 UNSPECIFIED ATRIAL FIBRILLATION: Chronic | Status: ACTIVE | Noted: 2019-05-15

## 2019-05-21 PROBLEM — M25.559 PAIN IN UNSPECIFIED HIP: Chronic | Status: ACTIVE | Noted: 2019-05-15

## 2019-05-21 PROBLEM — N28.9 DISORDER OF KIDNEY AND URETER, UNSPECIFIED: Chronic | Status: ACTIVE | Noted: 2019-05-15

## 2019-05-21 PROBLEM — N39.0 URINARY TRACT INFECTION, SITE NOT SPECIFIED: Chronic | Status: ACTIVE | Noted: 2019-05-16

## 2019-05-21 PROBLEM — N39.3 STRESS INCONTINENCE (FEMALE) (MALE): Chronic | Status: ACTIVE | Noted: 2019-05-15

## 2019-05-21 PROBLEM — R30.0 DYSURIA: Chronic | Status: ACTIVE | Noted: 2019-05-15

## 2019-05-21 PROBLEM — M06.9 RHEUMATOID ARTHRITIS, UNSPECIFIED: Chronic | Status: ACTIVE | Noted: 2019-05-16

## 2019-05-21 PROBLEM — M81.0 AGE-RELATED OSTEOPOROSIS WITHOUT CURRENT PATHOLOGICAL FRACTURE: Chronic | Status: ACTIVE | Noted: 2019-05-16

## 2019-05-21 PROBLEM — J30.2 OTHER SEASONAL ALLERGIC RHINITIS: Chronic | Status: ACTIVE | Noted: 2019-05-16

## 2019-05-21 PROBLEM — M19.90 UNSPECIFIED OSTEOARTHRITIS, UNSPECIFIED SITE: Chronic | Status: ACTIVE | Noted: 2019-05-16

## 2019-05-21 PROBLEM — D64.9 ANEMIA, UNSPECIFIED: Chronic | Status: ACTIVE | Noted: 2019-05-16

## 2019-05-21 PROBLEM — K21.9 GASTRO-ESOPHAGEAL REFLUX DISEASE WITHOUT ESOPHAGITIS: Chronic | Status: ACTIVE | Noted: 2019-05-15

## 2019-05-21 PROBLEM — I10 ESSENTIAL (PRIMARY) HYPERTENSION: Chronic | Status: ACTIVE | Noted: 2019-05-15

## 2019-05-21 PROBLEM — G47.30 SLEEP APNEA, UNSPECIFIED: Chronic | Status: ACTIVE | Noted: 2019-05-16

## 2019-05-28 DIAGNOSIS — I48.91 UNSPECIFIED ATRIAL FIBRILLATION: ICD-10-CM

## 2019-05-28 DIAGNOSIS — Z79.01 LONG TERM (CURRENT) USE OF ANTICOAGULANTS: ICD-10-CM

## 2019-05-28 DIAGNOSIS — E11.9 TYPE 2 DIABETES MELLITUS WITHOUT COMPLICATIONS: ICD-10-CM

## 2019-05-28 DIAGNOSIS — G47.33 OBSTRUCTIVE SLEEP APNEA (ADULT) (PEDIATRIC): ICD-10-CM

## 2019-05-28 DIAGNOSIS — K59.00 CONSTIPATION, UNSPECIFIED: ICD-10-CM

## 2019-05-28 DIAGNOSIS — M16.11 UNILATERAL PRIMARY OSTEOARTHRITIS, RIGHT HIP: ICD-10-CM

## 2019-05-28 DIAGNOSIS — Z90.49 ACQUIRED ABSENCE OF OTHER SPECIFIED PARTS OF DIGESTIVE TRACT: ICD-10-CM

## 2019-05-28 DIAGNOSIS — D64.9 ANEMIA, UNSPECIFIED: ICD-10-CM

## 2019-05-28 DIAGNOSIS — I10 ESSENTIAL (PRIMARY) HYPERTENSION: ICD-10-CM

## 2019-05-28 DIAGNOSIS — M81.0 AGE-RELATED OSTEOPOROSIS WITHOUT CURRENT PATHOLOGICAL FRACTURE: ICD-10-CM

## 2019-05-28 DIAGNOSIS — M06.9 RHEUMATOID ARTHRITIS, UNSPECIFIED: ICD-10-CM

## 2019-05-28 DIAGNOSIS — K21.9 GASTRO-ESOPHAGEAL REFLUX DISEASE WITHOUT ESOPHAGITIS: ICD-10-CM

## 2019-05-28 DIAGNOSIS — E87.1 HYPO-OSMOLALITY AND HYPONATREMIA: ICD-10-CM

## 2019-05-28 DIAGNOSIS — Z98.890 OTHER SPECIFIED POSTPROCEDURAL STATES: ICD-10-CM

## 2019-05-28 DIAGNOSIS — Z96.653 PRESENCE OF ARTIFICIAL KNEE JOINT, BILATERAL: ICD-10-CM

## 2019-05-29 ENCOUNTER — APPOINTMENT (OUTPATIENT)
Dept: ORTHOPEDIC SURGERY | Facility: CLINIC | Age: 84
End: 2019-05-29
Payer: MEDICARE

## 2019-05-29 PROCEDURE — 99024 POSTOP FOLLOW-UP VISIT: CPT

## 2019-05-29 NOTE — REASON FOR VISIT
[Follow-Up Visit] : a follow-up visit for [Joint Replacement Surgery, Aftercare] : joint replacement surgery, aftercare

## 2019-05-31 NOTE — ASSESSMENT
[FreeTextEntry1] : Assessment\par #12 weeks status post right total hip on May 16, 2019\par \par Plan\par #1 weightbearing and hip precautions until 5 weeks postop\par #2 begin physical therapy at 5 weeks, prescription given...\par #3 followup at 10 week anniversary\par #4 continue aspirin/anticoagulant until 5 weeks postop

## 2019-05-31 NOTE — PHYSICAL EXAM
[de-identified] : General: Not in acute distress, dressed appropriately, sitting on examination table\par Skin: Warm and dry, normal turgor, no rashes\par Neurological: AOx3, Cranial nerves grossly in tact\par Psych: Mood and affect appropriate\par \par Right Hip: Well healed surgical incision. No swelling edema erythema redness or drainage. Nontender.. ROM: Not assessed. 5/5 strength. Normal gait.

## 2019-06-24 RX ORDER — DOXYCYCLINE HYCLATE 100 MG/1
100 CAPSULE ORAL
Qty: 20 | Refills: 0 | Status: ACTIVE | COMMUNITY
Start: 2019-06-24 | End: 1900-01-01

## 2019-06-24 RX ORDER — AMOXICILLIN AND CLAVULANATE POTASSIUM 875; 125 MG/1; MG/1
875-125 TABLET, COATED ORAL
Qty: 20 | Refills: 0 | Status: ACTIVE | COMMUNITY
Start: 2019-06-24 | End: 1900-01-01

## 2019-07-21 ENCOUNTER — FORM ENCOUNTER (OUTPATIENT)
Age: 84
End: 2019-07-21

## 2019-07-22 ENCOUNTER — OUTPATIENT (OUTPATIENT)
Dept: OUTPATIENT SERVICES | Facility: HOSPITAL | Age: 84
LOS: 1 days | End: 2019-07-22
Payer: MEDICARE

## 2019-07-22 ENCOUNTER — APPOINTMENT (OUTPATIENT)
Dept: ORTHOPEDIC SURGERY | Facility: CLINIC | Age: 84
End: 2019-07-22
Payer: MEDICARE

## 2019-07-22 DIAGNOSIS — Z96.653 PRESENCE OF ARTIFICIAL KNEE JOINT, BILATERAL: Chronic | ICD-10-CM

## 2019-07-22 DIAGNOSIS — Z98.890 OTHER SPECIFIED POSTPROCEDURAL STATES: Chronic | ICD-10-CM

## 2019-07-22 DIAGNOSIS — Z90.49 ACQUIRED ABSENCE OF OTHER SPECIFIED PARTS OF DIGESTIVE TRACT: Chronic | ICD-10-CM

## 2019-07-22 PROCEDURE — 99024 POSTOP FOLLOW-UP VISIT: CPT

## 2019-07-22 PROCEDURE — 73502 X-RAY EXAM HIP UNI 2-3 VIEWS: CPT | Mod: 26,RT

## 2019-07-22 PROCEDURE — 73502 X-RAY EXAM HIP UNI 2-3 VIEWS: CPT

## 2019-07-23 NOTE — PHYSICAL EXAM
[de-identified] : General: Not in acute distress, dressed appropriately, sitting on examination table\par Skin: Warm and dry, normal turgor, no rashes\par Neurological: AOx3, Cranial nerves grossly in tact\par Psych: Mood and affect appropriate\par \par Right Hip: Well healed surgical incision. No swelling edema erythema redness or drainage. Nontender.. ROM: Not assessed. 5/5 strength. Normal gait.

## 2019-07-23 NOTE — HISTORY OF PRESENT ILLNESS
[de-identified] : Following up after right total hip, she is doing great, no complaints at this time.

## 2019-07-23 NOTE — ASSESSMENT
[FreeTextEntry1] : Assessment/plan\par Following up after right total hip, she is doing great, she'll continue her outpatient physical therapy and followup in 3 months for a new x-ray. Her x-rays today looked great and no changes from postop. Shows a well fixed well-positioned right total hip arthroplasty with 2 acetabular screws.\par \par All medical record entries made by the PA/Scribe/Fellow are at my, Dr. Rojas Damon's direction and personally dictated by me on 07/22/2019]. I have reviewed the chart and agree that the record accurately reflects my personal performance of the history, physical exam, assessment, and plan. I have also personally directed reviewed, and agreed with the chart.\par

## 2019-10-03 ENCOUNTER — FORM ENCOUNTER (OUTPATIENT)
Age: 84
End: 2019-10-03

## 2019-10-04 ENCOUNTER — OUTPATIENT (OUTPATIENT)
Dept: OUTPATIENT SERVICES | Facility: HOSPITAL | Age: 84
LOS: 1 days | End: 2019-10-04
Payer: MEDICARE

## 2019-10-04 ENCOUNTER — APPOINTMENT (OUTPATIENT)
Dept: ORTHOPEDIC SURGERY | Facility: CLINIC | Age: 84
End: 2019-10-04
Payer: MEDICARE

## 2019-10-04 DIAGNOSIS — Z90.49 ACQUIRED ABSENCE OF OTHER SPECIFIED PARTS OF DIGESTIVE TRACT: Chronic | ICD-10-CM

## 2019-10-04 DIAGNOSIS — Z98.890 OTHER SPECIFIED POSTPROCEDURAL STATES: Chronic | ICD-10-CM

## 2019-10-04 DIAGNOSIS — Z96.653 PRESENCE OF ARTIFICIAL KNEE JOINT, BILATERAL: Chronic | ICD-10-CM

## 2019-10-04 PROCEDURE — 73502 X-RAY EXAM HIP UNI 2-3 VIEWS: CPT | Mod: 26,LT

## 2019-10-04 PROCEDURE — 99213 OFFICE O/P EST LOW 20 MIN: CPT

## 2019-10-04 PROCEDURE — 73502 X-RAY EXAM HIP UNI 2-3 VIEWS: CPT

## 2019-10-21 ENCOUNTER — APPOINTMENT (OUTPATIENT)
Dept: ORTHOPEDIC SURGERY | Facility: CLINIC | Age: 84
End: 2019-10-21
Payer: MEDICARE

## 2019-10-21 VITALS
WEIGHT: 120 LBS | SYSTOLIC BLOOD PRESSURE: 159 MMHG | BODY MASS INDEX: 19.99 KG/M2 | HEIGHT: 65 IN | HEART RATE: 87 BPM | DIASTOLIC BLOOD PRESSURE: 99 MMHG

## 2019-10-21 PROCEDURE — 99214 OFFICE O/P EST MOD 30 MIN: CPT

## 2019-10-21 PROCEDURE — 73562 X-RAY EXAM OF KNEE 3: CPT | Mod: 50

## 2019-10-21 NOTE — PHYSICAL EXAM
[de-identified] : Left knee xrays,  AP, lateral, merchant view,  taken at the office today on 10/21/2019 demonstrates a revision total knee replacement with cemented femoral and tibial stem, augment tibial component, 2 cables noted around the femur, shell of patella bone noted on merchant view, speckled calcification posterior vessels \par \par Right knee xrays,  AP, lateral, merchant view, taken at the office today 10/21/2019 demonstrates a total knee replacement with IB1 in satisfactory position and alignment. No evidence of loosening, screw noted in the medial tibial plateau, the patella sits in a central position with bone resorption and RLL consistent with possible loose patella component  [de-identified] : General appearance: well nourished and hydrated, pleasant, alert and oriented x 3, cooperative.\par HEENT: Normocephalic, EOM intact, Nasal septum midline, Oral cavity clear, External auditory canal clear.\par Cardiovascular: no apparent abnormalities, no lower leg edema, no varicosities, pedal pulses are palpable.\par Lymphatics Lymph nodes: none palpated, Lymphedema: not present.\par Neurologic: sensation is normal, no muscle weakness in upper or lower extremities, patella tendon reflexes intact .\par Dermatologic no apparent skin lesions, moist, warm, no rash.\par Spine: cervical spine appears normal and moves freely,  obvious thoracolumbar deformity with kyphosis and limited motion\par Gait: nonantalgic.\par \par Left Knee\par Inspection: no effusion\par Wounds: healed midline incision\par Alignment: normal.\par Palpation: no specific tenderness on palpation.\par ROM: Active (in degrees) 0-90\par Ligamentous laxity (neg): negative ant. drawer test, negative post. drawer test, stable to varus stress test, stable to valgus stress test,\par Patellofemoral Alignment Test: Q angle-, normal.\par Muscle Test: good quad strength.\par Leg examination: calf is soft and non-tender.\par \par Right Knee\par Inspection: mild soft tissue swelling \par Wounds: healed midline incision\par Alignment: normal.\par Palpation: no specific tenderness on palpation.\par ROM: Active (in degrees) 0-100 with a patellofemoral click\par Ligamentous laxity (neg): negative ant. drawer test, negative post. drawer test, stable to varus stress test, stable to valgus stress test,\par Patellofemoral Alignment Test: Q angle-, normal.\par Muscle Test: good quad strength.\par Leg examination: calf is soft and non-tender. \par \par Left hip\par Inspection: No swelling or ecchymosis.\par Wounds: none.\par Palpation: non-tender.\par Stability: no instability.\par Strength: 5/5 all motor groups.\par ROM: no pain with FROM.\par Leg length: equal.\par \par Right hip\par Inspection: No swelling or ecchymosis.\par Wounds: healed surgical incision\par Palpation: non-tender.\par Stability: no instability.\par Strength: 5/5 all motor groups.\par ROM: no pain with FROM.\par Leg length: equal.\par \par Left ankle\par Inspection: no erythema noted, no swelling noted.\par Palpation: no pain on palpation .\par ROM:  essentially ankylosed\par Muscle strength: 5/5.\par Stability: no instability noted.\par \par Right ankle\par Inspection: no erythema noted, no swelling noted.\par ROM: essentially ankylosed\par Palpation: no pain on palpation .\par Muscle strength: 5/5.\par Stability: no instability noted.\par \par Left foot\par Inspection: deformity of forefoot. \par ROM: full range of motion of all joints without pain or crepitus.\par Palpation: no tenderness.\par Stability: no instability noted.\par \par Right foot\par Inspection: deformity of forefoot with loss of the second toe\par ROM: full range of motion of all joints without pain or crepitus.\par Palpation: no tenderness.\par Stability: no instability noted.\par \par Left shoulder\par Inspection: no muscle asymmetry, no atrophy.\par Palpation: no tenderness noted, ACJ non-tender.\par ROM: full active ROM, full passive ROM.\par Strength testing): anterior deltoid, supraspinatus, infraspinatus, subscapularis all 5/5.\par Stability test: ant. apprehension negative, post. apprehension negative, relocation test negative.\par Impingement Test: negative NEER.\par \par Right shoulder\par Inspection: no muscle asymmetry, no atrophy.\par Palpation: no tenderness noted, ACJ non-tender.\par ROM: full active ROM, full passive ROM.\par Strength testing): anterior deltoid, supraspinatus, infraspinatus, subscapularis all 5/5.\par Stability test: ant. apprehension negative, post. apprehension negative, relocation test negative.\par Impingement Test: negative NEER.\par Surgical Wounds: none.\par \par Left elbow\par Inspection: negative swelling.\par Wounds: none.\par Palpation: non-tender.\par ROM: full ROM.\par Strength: 5/5 all groups.\par Stability: no instability.\par Mass: none.\par \par Right elbow\par Inspection: negative swelling.\par Wounds: none.\par Palpation: non-tender.\par ROM: full ROM.\par Strength: 5/5 all groups.\par Stability: no instability.\par Mass: none.\par \par Left wrist\par Inspection: negative swelling.\par Wound: none.\par Palpation (bone): no tenderness.\par ROM: full ROM.\par Strength: full , good.\par \par Right wrist\par Inspection: negative swelling.\par Wound: none.\par Palpation (bone): no tenderness.\par ROM: full ROM.\par Strength: full , good.\par \par Left hand\par Inspection: ulnar drift noted with deformity of all digits. \par Wounds: none.\par Palpation: non-tender throughout.\par Strength: unable able to make full fist.\par Sensation: light touch intact all fingers and thumb.\par Vascular: good capillary refill < 3 seconds, all fingers and thumb.\par Mass: none.\par \par Right hand\par Inspection: ulnar drift noted with deformity of all digits. \par Wounds: none.\par Palpation: non-tender throughout.\par Strength: unable able to make full fist.\par Sensation: light touch intact all fingers and thumb.\par Vascular: good capillary refill < 3 seconds, all fingers and thumb.\par Mass: none.\par

## 2019-10-21 NOTE — ADDENDUM
[FreeTextEntry1] : This note was written by Lindsay Rodriguez on 10/21/2019 acting as scribe for Dr. John Arreguin M.D.\par \par I, Dr. John Arreguin M.D., have read and attest that all the information, medical decision making and discharge instructions within are true and accurate.

## 2019-10-21 NOTE — HISTORY OF PRESENT ILLNESS
[de-identified] : 83 year old female presents for evaluation of bilateral TKA done in 1986 with a subsequent left revision TKR in 2010 by Dr. Fernandes. Patient reports she was at PT after a THR 2 months ago where she sustained a twisting injury. She would like to make sure her implants are stable. She does not complain of pain or functional limitations at this time. Patient would like to make sure she can return to PT without damaging her implants. She denies fever, chills, or sweats.

## 2019-10-21 NOTE — DISCUSSION/SUMMARY
[de-identified] : Discussed at length the nature of the patients condition. Her bilateral TKR, right primary and left revision appear to be functioning well. She is more encumbered by the multiple joint involvement with her rheumatoid arthritis. I recommended she continue PT. I reassured her this will not impair her implants. She should continue with the walker for ambulation. They can continue activities as tolerated. Patient may follow up with x-rays in 6 months.

## 2019-10-22 NOTE — PHYSICAL EXAM
[de-identified] : General: Not in acute distress, dressed appropriately, sitting on examination table\par Skin: Warm and dry, normal turgor, no rashes\par Neurological: AOx3, Cranial nerves grossly in tact\par Psych: Mood and affect appropriate\par [Left/Right] Hip: No swelling edema erythema redness or drainage. Tender [anteriorly/laterally]. ROM: Hip flexion 120, abduction 30, int/ext rotation 45. Painful range of motion. 5/5 strength. Normal gait.

## 2019-10-22 NOTE — ASSESSMENT
[FreeTextEntry1] : Assessment/Plan\par \par Bilateral hip pain, L>R\par \par PT referral for whole body.\par \par Activities and ADLs as tolerated. F/U 6 weeks.\par \par All medical record entries made by the PA/Scribe/Fellow are at my, Dr. Rojas Damon's direction and personally dictated by me on 10/04/2019]. I have reviewed the chart and agree that the record accurately reflects my personal performance of the history, physical exam, assessment, and plan. I have also personally directed reviewed, and agreed with the chart.\par

## 2019-10-22 NOTE — HISTORY OF PRESENT ILLNESS
[de-identified] : Anastasia is a 83 year old female with a SHx of R THR on 05/16/2019 who presents with bilateral hip pain, L>R. Patient is having difficulty ambulating around the house and has trouble when ascending and descending stairs. This is affecting her as she lives on the second floor. Patient has not yet done any PT.

## 2020-04-20 ENCOUNTER — APPOINTMENT (OUTPATIENT)
Dept: ORTHOPEDIC SURGERY | Facility: CLINIC | Age: 85
End: 2020-04-20

## 2020-06-10 ENCOUNTER — APPOINTMENT (OUTPATIENT)
Dept: ORTHOPEDIC SURGERY | Facility: CLINIC | Age: 85
End: 2020-06-10
Payer: MEDICARE

## 2020-06-10 DIAGNOSIS — M16.12 UNILATERAL PRIMARY OSTEOARTHRITIS, LEFT HIP: ICD-10-CM

## 2020-06-10 DIAGNOSIS — Z96.641 PRESENCE OF RIGHT ARTIFICIAL HIP JOINT: ICD-10-CM

## 2020-06-10 PROCEDURE — 99213 OFFICE O/P EST LOW 20 MIN: CPT | Mod: 95

## 2020-06-11 PROBLEM — M16.12 ARTHRITIS OF LEFT HIP: Status: ACTIVE | Noted: 2020-06-11

## 2020-06-11 PROBLEM — Z96.641 HISTORY OF TOTAL RIGHT HIP REPLACEMENT: Status: ACTIVE | Noted: 2019-05-29

## 2020-06-13 NOTE — ASSESSMENT
[FreeTextEntry1] : A\par S/P RTHA doing great\par LH arthritis\par Back pain\par P\par Pain is more suspicious for back pain than hip joint pain\par Patient to come to office for intraarticular joint injection with lidocaine only and need to gauge response\par If good pain response then hip is source and would schedule for OLIVIA\par \par \par All medical record entries made by the PA/Loren/Fellow are at my, Dr. Rojas Damon's direction and personally dictated by me on 06/10/2020]. I have reviewed the chart and agree that the record accurately reflects my personal performance of the history, physical exam, assessment, and plan. I have also personally directed reviewed, and agreed with the chart.\par

## 2020-06-13 NOTE — HISTORY OF PRESENT ILLNESS
[Home] : at home, [unfilled] , at the time of the visit. [Medical Office: ([facility])___] : at the medical office located in  [Family Member] : family member [Verbal consent obtained from patient] : the patient, [unfilled] [de-identified] : Presenting for left hip today. Hx of RTHA last year, doing great. Intense left hip pain x 2 weeks, pain is lateral and radiates to mid thigh, not past knee. Pain is burning, associated with cramps and spasms. Denies groin pain. No meds or treatments yet.

## 2020-06-13 NOTE — PHYSICAL EXAM
[de-identified] : Focused exam left hip: no swelling edema erythema redness drainage. Painful ROM. Tender laterally. Unable to assess strength/gait due to nature of virtual visit.  [de-identified] : Old xrays show severe left hip arthritis

## 2020-07-13 ENCOUNTER — LABORATORY RESULT (OUTPATIENT)
Age: 85
End: 2020-07-13

## 2020-07-13 ENCOUNTER — APPOINTMENT (OUTPATIENT)
Dept: ORTHOPEDIC SURGERY | Facility: CLINIC | Age: 85
End: 2020-07-13

## 2020-07-15 ENCOUNTER — TRANSCRIPTION ENCOUNTER (OUTPATIENT)
Age: 85
End: 2020-07-15

## 2020-07-15 VITALS
SYSTOLIC BLOOD PRESSURE: 128 MMHG | TEMPERATURE: 98 F | DIASTOLIC BLOOD PRESSURE: 76 MMHG | WEIGHT: 110.01 LBS | HEIGHT: 65 IN | RESPIRATION RATE: 18 BRPM | HEART RATE: 85 BPM

## 2020-07-16 ENCOUNTER — APPOINTMENT (OUTPATIENT)
Dept: ORTHOPEDIC SURGERY | Facility: HOSPITAL | Age: 85
End: 2020-07-16
Payer: MEDICARE

## 2020-07-16 ENCOUNTER — INPATIENT (INPATIENT)
Facility: HOSPITAL | Age: 85
LOS: 5 days | Discharge: NURSING, REHAB OR LONG-TERM CARE | DRG: 469 | End: 2020-07-22
Attending: ORTHOPAEDIC SURGERY | Admitting: ORTHOPAEDIC SURGERY
Payer: MEDICARE

## 2020-07-16 DIAGNOSIS — K21.9 GASTRO-ESOPHAGEAL REFLUX DISEASE WITHOUT ESOPHAGITIS: ICD-10-CM

## 2020-07-16 DIAGNOSIS — Z96.653 PRESENCE OF ARTIFICIAL KNEE JOINT, BILATERAL: Chronic | ICD-10-CM

## 2020-07-16 DIAGNOSIS — M06.9 RHEUMATOID ARTHRITIS, UNSPECIFIED: ICD-10-CM

## 2020-07-16 DIAGNOSIS — Z98.890 OTHER SPECIFIED POSTPROCEDURAL STATES: Chronic | ICD-10-CM

## 2020-07-16 DIAGNOSIS — I10 ESSENTIAL (PRIMARY) HYPERTENSION: ICD-10-CM

## 2020-07-16 DIAGNOSIS — M19.90 UNSPECIFIED OSTEOARTHRITIS, UNSPECIFIED SITE: ICD-10-CM

## 2020-07-16 DIAGNOSIS — G47.30 SLEEP APNEA, UNSPECIFIED: ICD-10-CM

## 2020-07-16 DIAGNOSIS — N28.9 DISORDER OF KIDNEY AND URETER, UNSPECIFIED: ICD-10-CM

## 2020-07-16 DIAGNOSIS — I48.91 UNSPECIFIED ATRIAL FIBRILLATION: ICD-10-CM

## 2020-07-16 DIAGNOSIS — N39.3 STRESS INCONTINENCE (FEMALE) (MALE): ICD-10-CM

## 2020-07-16 DIAGNOSIS — Z90.49 ACQUIRED ABSENCE OF OTHER SPECIFIED PARTS OF DIGESTIVE TRACT: Chronic | ICD-10-CM

## 2020-07-16 LAB
ALBUMIN SERPL ELPH-MCNC: 3.3 G/DL — SIGNIFICANT CHANGE UP (ref 3.3–5)
ALP SERPL-CCNC: 88 U/L — SIGNIFICANT CHANGE UP (ref 40–120)
ALT FLD-CCNC: 23 U/L — SIGNIFICANT CHANGE UP (ref 10–45)
ANION GAP SERPL CALC-SCNC: 10 MMOL/L — SIGNIFICANT CHANGE UP (ref 5–17)
ANION GAP SERPL CALC-SCNC: 12 MMOL/L — SIGNIFICANT CHANGE UP (ref 5–17)
APTT BLD: 27.9 SEC — SIGNIFICANT CHANGE UP (ref 27.5–35.5)
AST SERPL-CCNC: 38 U/L — SIGNIFICANT CHANGE UP (ref 10–40)
BASE EXCESS BLDA CALC-SCNC: -2.9 MMOL/L — LOW (ref -2–3)
BASOPHILS # BLD AUTO: 0.05 K/UL — SIGNIFICANT CHANGE UP (ref 0–0.2)
BASOPHILS NFR BLD AUTO: 0.2 % — SIGNIFICANT CHANGE UP (ref 0–2)
BILIRUB SERPL-MCNC: 0.4 MG/DL — SIGNIFICANT CHANGE UP (ref 0.2–1.2)
BUN SERPL-MCNC: 15 MG/DL — SIGNIFICANT CHANGE UP (ref 7–23)
BUN SERPL-MCNC: 16 MG/DL — SIGNIFICANT CHANGE UP (ref 7–23)
CALCIUM SERPL-MCNC: 8.2 MG/DL — LOW (ref 8.4–10.5)
CALCIUM SERPL-MCNC: 8.4 MG/DL — SIGNIFICANT CHANGE UP (ref 8.4–10.5)
CHLORIDE SERPL-SCNC: 93 MMOL/L — LOW (ref 96–108)
CHLORIDE SERPL-SCNC: 97 MMOL/L — SIGNIFICANT CHANGE UP (ref 96–108)
CO2 SERPL-SCNC: 24 MMOL/L — SIGNIFICANT CHANGE UP (ref 22–31)
CO2 SERPL-SCNC: 25 MMOL/L — SIGNIFICANT CHANGE UP (ref 22–31)
CREAT SERPL-MCNC: 0.52 MG/DL — SIGNIFICANT CHANGE UP (ref 0.5–1.3)
CREAT SERPL-MCNC: 0.57 MG/DL — SIGNIFICANT CHANGE UP (ref 0.5–1.3)
EOSINOPHIL # BLD AUTO: 0.01 K/UL — SIGNIFICANT CHANGE UP (ref 0–0.5)
EOSINOPHIL NFR BLD AUTO: 0 % — SIGNIFICANT CHANGE UP (ref 0–6)
GLUCOSE BLDC GLUCOMTR-MCNC: 256 MG/DL — HIGH (ref 70–99)
GLUCOSE SERPL-MCNC: 117 MG/DL — HIGH (ref 70–99)
GLUCOSE SERPL-MCNC: 261 MG/DL — HIGH (ref 70–99)
HCO3 BLDA-SCNC: 28 MMOL/L — SIGNIFICANT CHANGE UP (ref 21–28)
HCT VFR BLD CALC: 34.7 % — SIGNIFICANT CHANGE UP (ref 34.5–45)
HGB BLD-MCNC: 11.1 G/DL — LOW (ref 11.5–15.5)
IMM GRANULOCYTES NFR BLD AUTO: 0.7 % — SIGNIFICANT CHANGE UP (ref 0–1.5)
INR BLD: 1.11 — SIGNIFICANT CHANGE UP (ref 0.88–1.16)
LACTATE SERPL-SCNC: 2.6 MMOL/L — HIGH (ref 0.5–2)
LYMPHOCYTES # BLD AUTO: 0.61 K/UL — LOW (ref 1–3.3)
LYMPHOCYTES # BLD AUTO: 2.9 % — LOW (ref 13–44)
MAGNESIUM SERPL-MCNC: 1.9 MG/DL — SIGNIFICANT CHANGE UP (ref 1.6–2.6)
MCHC RBC-ENTMCNC: 30.9 PG — SIGNIFICANT CHANGE UP (ref 27–34)
MCHC RBC-ENTMCNC: 32 GM/DL — SIGNIFICANT CHANGE UP (ref 32–36)
MCV RBC AUTO: 96.7 FL — SIGNIFICANT CHANGE UP (ref 80–100)
MONOCYTES # BLD AUTO: 1.24 K/UL — HIGH (ref 0–0.9)
MONOCYTES NFR BLD AUTO: 6 % — SIGNIFICANT CHANGE UP (ref 2–14)
NEUTROPHILS # BLD AUTO: 18.72 K/UL — HIGH (ref 1.8–7.4)
NEUTROPHILS NFR BLD AUTO: 90.2 % — HIGH (ref 43–77)
NRBC # BLD: 0 /100 WBCS — SIGNIFICANT CHANGE UP (ref 0–0)
PCO2 BLDA: 92 MMHG — CRITICAL HIGH (ref 32–45)
PH BLDA: 7.11 — CRITICAL LOW (ref 7.35–7.45)
PHOSPHATE SERPL-MCNC: 6.1 MG/DL — HIGH (ref 2.5–4.5)
PLATELET # BLD AUTO: 360 K/UL — SIGNIFICANT CHANGE UP (ref 150–400)
PO2 BLDA: 356 MMHG — HIGH (ref 83–108)
POTASSIUM SERPL-MCNC: 3.2 MMOL/L — LOW (ref 3.5–5.3)
POTASSIUM SERPL-MCNC: 3.3 MMOL/L — LOW (ref 3.5–5.3)
POTASSIUM SERPL-SCNC: 3.2 MMOL/L — LOW (ref 3.5–5.3)
POTASSIUM SERPL-SCNC: 3.3 MMOL/L — LOW (ref 3.5–5.3)
PROT SERPL-MCNC: 6.6 G/DL — SIGNIFICANT CHANGE UP (ref 6–8.3)
PROTHROM AB SERPL-ACNC: 13.2 SEC — SIGNIFICANT CHANGE UP (ref 10.6–13.6)
RBC # BLD: 3.59 M/UL — LOW (ref 3.8–5.2)
RBC # FLD: 12.6 % — SIGNIFICANT CHANGE UP (ref 10.3–14.5)
SAO2 % BLDA: 100 % — SIGNIFICANT CHANGE UP (ref 95–100)
SODIUM SERPL-SCNC: 129 MMOL/L — LOW (ref 135–145)
SODIUM SERPL-SCNC: 132 MMOL/L — LOW (ref 135–145)
TROPONIN T SERPL-MCNC: <0.01 NG/ML — SIGNIFICANT CHANGE UP (ref 0–0.01)
WBC # BLD: 20.77 K/UL — HIGH (ref 3.8–10.5)
WBC # FLD AUTO: 20.77 K/UL — HIGH (ref 3.8–10.5)

## 2020-07-16 PROCEDURE — 27130 TOTAL HIP ARTHROPLASTY: CPT | Mod: AS,LT

## 2020-07-16 PROCEDURE — 93010 ELECTROCARDIOGRAM REPORT: CPT

## 2020-07-16 PROCEDURE — 72170 X-RAY EXAM OF PELVIS: CPT | Mod: 26,77

## 2020-07-16 PROCEDURE — 72170 X-RAY EXAM OF PELVIS: CPT | Mod: 26

## 2020-07-16 PROCEDURE — 71045 X-RAY EXAM CHEST 1 VIEW: CPT | Mod: 26

## 2020-07-16 PROCEDURE — 27130 TOTAL HIP ARTHROPLASTY: CPT | Mod: LT

## 2020-07-16 PROCEDURE — 70450 CT HEAD/BRAIN W/O DYE: CPT | Mod: 26

## 2020-07-16 PROCEDURE — 71275 CT ANGIOGRAPHY CHEST: CPT | Mod: 26

## 2020-07-16 RX ORDER — AMLODIPINE BESYLATE 2.5 MG/1
5 TABLET ORAL DAILY
Refills: 0 | Status: DISCONTINUED | OUTPATIENT
Start: 2020-07-16 | End: 2020-07-17

## 2020-07-16 RX ORDER — CELECOXIB 200 MG/1
200 CAPSULE ORAL
Refills: 0 | Status: DISCONTINUED | OUTPATIENT
Start: 2020-07-16 | End: 2020-07-17

## 2020-07-16 RX ORDER — PANTOPRAZOLE SODIUM 20 MG/1
40 TABLET, DELAYED RELEASE ORAL
Refills: 0 | Status: DISCONTINUED | OUTPATIENT
Start: 2020-07-16 | End: 2020-07-17

## 2020-07-16 RX ORDER — PROPOFOL 10 MG/ML
16.7 INJECTION, EMULSION INTRAVENOUS
Qty: 1000 | Refills: 0 | Status: DISCONTINUED | OUTPATIENT
Start: 2020-07-16 | End: 2020-07-17

## 2020-07-16 RX ORDER — POVIDONE-IODINE 5 %
1 AEROSOL (ML) TOPICAL ONCE
Refills: 0 | Status: COMPLETED | OUTPATIENT
Start: 2020-07-16 | End: 2020-07-16

## 2020-07-16 RX ORDER — MAGNESIUM HYDROXIDE 400 MG/1
30 TABLET, CHEWABLE ORAL DAILY
Refills: 0 | Status: DISCONTINUED | OUTPATIENT
Start: 2020-07-16 | End: 2020-07-17

## 2020-07-16 RX ORDER — MONTELUKAST 4 MG/1
1 TABLET, CHEWABLE ORAL
Qty: 0 | Refills: 0 | DISCHARGE

## 2020-07-16 RX ORDER — APIXABAN 2.5 MG/1
2.5 TABLET, FILM COATED ORAL EVERY 12 HOURS
Refills: 0 | Status: DISCONTINUED | OUTPATIENT
Start: 2020-07-16 | End: 2020-07-22

## 2020-07-16 RX ORDER — POLYETHYLENE GLYCOL 3350 17 G/17G
17 POWDER, FOR SOLUTION ORAL DAILY
Refills: 0 | Status: DISCONTINUED | OUTPATIENT
Start: 2020-07-16 | End: 2020-07-17

## 2020-07-16 RX ORDER — HYDROCHLOROTHIAZIDE 25 MG
12.5 TABLET ORAL DAILY
Refills: 0 | Status: DISCONTINUED | OUTPATIENT
Start: 2020-07-16 | End: 2020-07-17

## 2020-07-16 RX ORDER — APIXABAN 2.5 MG/1
1 TABLET, FILM COATED ORAL
Qty: 0 | Refills: 0 | DISCHARGE

## 2020-07-16 RX ORDER — SENNA PLUS 8.6 MG/1
2 TABLET ORAL AT BEDTIME
Refills: 0 | Status: DISCONTINUED | OUTPATIENT
Start: 2020-07-16 | End: 2020-07-17

## 2020-07-16 RX ORDER — ACETAMINOPHEN 500 MG
650 TABLET ORAL EVERY 6 HOURS
Refills: 0 | Status: DISCONTINUED | OUTPATIENT
Start: 2020-07-16 | End: 2020-07-17

## 2020-07-16 RX ORDER — APIXABAN 2.5 MG/1
2.5 TABLET, FILM COATED ORAL
Refills: 0 | Status: DISCONTINUED | OUTPATIENT
Start: 2020-07-16 | End: 2020-07-16

## 2020-07-16 RX ORDER — FLUTICASONE PROPIONATE 50 MCG
1 SPRAY, SUSPENSION NASAL DAILY
Refills: 0 | Status: DISCONTINUED | OUTPATIENT
Start: 2020-07-16 | End: 2020-07-17

## 2020-07-16 RX ORDER — METOPROLOL TARTRATE 50 MG
25 TABLET ORAL DAILY
Refills: 0 | Status: DISCONTINUED | OUTPATIENT
Start: 2020-07-16 | End: 2020-07-17

## 2020-07-16 RX ORDER — FAMOTIDINE 10 MG/ML
20 INJECTION INTRAVENOUS EVERY 12 HOURS
Refills: 0 | Status: DISCONTINUED | OUTPATIENT
Start: 2020-07-16 | End: 2020-07-17

## 2020-07-16 RX ORDER — CEFAZOLIN SODIUM 1 G
2000 VIAL (EA) INJECTION EVERY 8 HOURS
Refills: 0 | Status: COMPLETED | OUTPATIENT
Start: 2020-07-16 | End: 2020-07-17

## 2020-07-16 RX ORDER — DIPHENHYDRAMINE HCL 50 MG
25 CAPSULE ORAL AT BEDTIME
Refills: 0 | Status: DISCONTINUED | OUTPATIENT
Start: 2020-07-16 | End: 2020-07-17

## 2020-07-16 RX ORDER — ASPIRIN/CALCIUM CARB/MAGNESIUM 324 MG
81 TABLET ORAL
Refills: 0 | Status: DISCONTINUED | OUTPATIENT
Start: 2020-07-16 | End: 2020-07-17

## 2020-07-16 RX ORDER — MOMETASONE FUROATE 50 UG/1
2 SPRAY NASAL
Qty: 0 | Refills: 0 | DISCHARGE

## 2020-07-16 RX ORDER — CHLORHEXIDINE GLUCONATE 213 G/1000ML
1 SOLUTION TOPICAL ONCE
Refills: 0 | Status: COMPLETED | OUTPATIENT
Start: 2020-07-16 | End: 2020-07-16

## 2020-07-16 RX ORDER — LABETALOL HCL 100 MG
5 TABLET ORAL ONCE
Refills: 0 | Status: DISCONTINUED | OUTPATIENT
Start: 2020-07-16 | End: 2020-07-17

## 2020-07-16 RX ORDER — ONDANSETRON 8 MG/1
4 TABLET, FILM COATED ORAL EVERY 6 HOURS
Refills: 0 | Status: DISCONTINUED | OUTPATIENT
Start: 2020-07-16 | End: 2020-07-17

## 2020-07-16 RX ORDER — PANTOPRAZOLE SODIUM 20 MG/1
1 TABLET, DELAYED RELEASE ORAL
Qty: 0 | Refills: 0 | DISCHARGE

## 2020-07-16 RX ORDER — HYDROMORPHONE HYDROCHLORIDE 2 MG/ML
0.5 INJECTION INTRAMUSCULAR; INTRAVENOUS; SUBCUTANEOUS
Refills: 0 | Status: DISCONTINUED | OUTPATIENT
Start: 2020-07-16 | End: 2020-07-16

## 2020-07-16 RX ORDER — OXYCODONE HYDROCHLORIDE 5 MG/1
10 TABLET ORAL EVERY 4 HOURS
Refills: 0 | Status: DISCONTINUED | OUTPATIENT
Start: 2020-07-16 | End: 2020-07-17

## 2020-07-16 RX ORDER — KETOROLAC TROMETHAMINE 30 MG/ML
15 SYRINGE (ML) INJECTION ONCE
Refills: 0 | Status: DISCONTINUED | OUTPATIENT
Start: 2020-07-16 | End: 2020-07-17

## 2020-07-16 RX ORDER — SODIUM CHLORIDE 9 MG/ML
1000 INJECTION, SOLUTION INTRAVENOUS
Refills: 0 | Status: DISCONTINUED | OUTPATIENT
Start: 2020-07-16 | End: 2020-07-17

## 2020-07-16 RX ORDER — LABETALOL HCL 100 MG
5 TABLET ORAL ONCE
Refills: 0 | Status: COMPLETED | OUTPATIENT
Start: 2020-07-16 | End: 2020-07-16

## 2020-07-16 RX ORDER — HYDRALAZINE HCL 50 MG
5 TABLET ORAL ONCE
Refills: 0 | Status: COMPLETED | OUTPATIENT
Start: 2020-07-16 | End: 2020-07-16

## 2020-07-16 RX ADMIN — CELECOXIB 200 MILLIGRAM(S): 200 CAPSULE ORAL at 18:15

## 2020-07-16 RX ADMIN — APIXABAN 2.5 MILLIGRAM(S): 2.5 TABLET, FILM COATED ORAL at 18:12

## 2020-07-16 RX ADMIN — HYDROMORPHONE HYDROCHLORIDE 0.5 MILLIGRAM(S): 2 INJECTION INTRAMUSCULAR; INTRAVENOUS; SUBCUTANEOUS at 17:26

## 2020-07-16 RX ADMIN — Medication 5 MILLIGRAM(S): at 17:30

## 2020-07-16 RX ADMIN — Medication 650 MILLIGRAM(S): at 18:12

## 2020-07-16 RX ADMIN — HYDROMORPHONE HYDROCHLORIDE 0.5 MILLIGRAM(S): 2 INJECTION INTRAMUSCULAR; INTRAVENOUS; SUBCUTANEOUS at 17:45

## 2020-07-16 RX ADMIN — Medication 25 MILLIGRAM(S): at 19:24

## 2020-07-16 RX ADMIN — HYDROMORPHONE HYDROCHLORIDE 0.5 MILLIGRAM(S): 2 INJECTION INTRAMUSCULAR; INTRAVENOUS; SUBCUTANEOUS at 14:38

## 2020-07-16 RX ADMIN — Medication 5 MILLIGRAM(S): at 16:49

## 2020-07-16 RX ADMIN — Medication 100 MILLIGRAM(S): at 21:06

## 2020-07-16 RX ADMIN — Medication 1 APPLICATION(S): at 10:32

## 2020-07-16 RX ADMIN — CHLORHEXIDINE GLUCONATE 1 APPLICATION(S): 213 SOLUTION TOPICAL at 10:32

## 2020-07-16 RX ADMIN — Medication 81 MILLIGRAM(S): at 18:15

## 2020-07-16 RX ADMIN — OXYCODONE HYDROCHLORIDE 10 MILLIGRAM(S): 5 TABLET ORAL at 17:24

## 2020-07-16 RX ADMIN — HYDROMORPHONE HYDROCHLORIDE 0.5 MILLIGRAM(S): 2 INJECTION INTRAMUSCULAR; INTRAVENOUS; SUBCUTANEOUS at 15:49

## 2020-07-16 NOTE — H&P ADULT - PROBLEM SELECTOR PLAN 1
Admit to Orthopaedic Service.  Presents today for elective left OLIVIA   Pt medically stable and cleared for procedure today by Dr. Lopez/Dr. Burk details… detailed exam

## 2020-07-16 NOTE — PROGRESS NOTE ADULT - SUBJECTIVE AND OBJECTIVE BOX
Ortho Post Op Check    Procedure: L OLIVIA   Surgeon: Dr. Damon     Pt comfortable without complaints, pain controlled  Denies CP, SOB, N/V, numbness/tingling     Vital Signs Last 24 Hrs  T(C): 36.1 (07-16-20 @ 13:59), Max: 36.1 (07-16-20 @ 13:59)  T(F): 97 (07-16-20 @ 13:59), Max: 97 (07-16-20 @ 13:59)  HR: 140 (07-16-20 @ 17:15) (67 - 140)  BP: 184/70 (07-16-20 @ 17:15) (124/58 - 184/70)  BP(mean): 84 (07-16-20 @ 14:44) (84 - 96)  RR: 15 (07-16-20 @ 17:15) (11 - 16)  SpO2: 100% (07-16-20 @ 17:15) (100% - 100%)      General: Pt Alert and oriented, NAD  DSG C/D/I  Pulses: palpable DP pulse   Sensation: SILT over distal limb and symmetric to contralateral  Motor: 5/5 EHL/FHL/TA/GS    16 Jul 2020 13:28    132    |  97     |  15     ----------------------------<  117    3.2     |  25     |  0.52     Ca    8.4        16 Jul 2020 13:28        Post-op X-Ray:  hardware intact poss actebular wall deficiency/fracture    A/P: 84yFemale POD#0 s/p L OLIIVA  - Stable  - Pain Control  - DVT ppx: asa  - Post op abx: ancef  - PT, WBS: PWB    Ortho Pager [phone]

## 2020-07-16 NOTE — H&P ADULT - NSHPPHYSICALEXAM_GEN_ALL_CORE
PE: Decreased ROM to left hip secondary to pain.   MSK: No deformity or open wounds. No rashes or lesions. EHL/TA/GS/FHL 5/5 BLE. Sensation intact and equal BLE. Skin warm and well perfused. DP palpable BLE. Cap refill brisk.   Chronic Bilateral hand deformity c/w RA   Rest of PE per medical clearance.

## 2020-07-16 NOTE — AIRWAY PLACEMENT NOTE ADULT - POST AIRWAY PLACEMENT ASSESSMENT:
breath sounds bilateral/breath sounds equal/CXR pending/chest excursion noted/positive end tidal CO2 noted

## 2020-07-16 NOTE — CHART NOTE - NSCHARTNOTEFT_GEN_A_CORE
84F presented to [facility] for an elective hip replacement, in PACU pt noted to be hypertensive and was given x1 5 hydral with no response, shortly after noted to be in afib with rvr and hypertensive given labetalol IV push stat x2 w resolution of hypertension and continued tachycardia to low 100 and 110s. when pt arrived to floor tachycardia was increase to low 120s-130s and was given her metoprolol 25. @ around 930-10pm pt noted to be unresponsive so rapid/anesthesia was called for eval for poss intubation. pt was intubated by anesthesia.    of note, pt states she did not take Metroprolol for 4 days prior to surgery.

## 2020-07-16 NOTE — H&P ADULT - HISTORY OF PRESENT ILLNESS
84F with left hip pain x years that was controlled with conservative tx. Pt started using a walker in June and quickly required a wheelchair over the last month. Pt denies numbness/tingling/paresthesias to LLE. Presents today for left OLIVIA.  Pt takes Eliquis twice a day for Afib.

## 2020-07-16 NOTE — H&P ADULT - NSICDXPASTMEDICALHX_GEN_ALL_CORE_FT
PAST MEDICAL HISTORY:  Afib     Anemia     Dysuria     GERD (gastroesophageal reflux disease)     Hip pain     HTN (hypertension)     Osteoarthritis     Osteoporosis     Renal insufficiency     Rheumatoid arthritis     Seasonal allergies     Sleep apnea does not use cpap    Stress incontinence, female     UTI (urinary tract infection)

## 2020-07-16 NOTE — H&P ADULT - NSHPLABSRESULTS_GEN_ALL_CORE
Preop CBC, BMP, PT/PTT/INR, UA within normal limits- reviewed by medical clearance.   Preop EKG: NSR, reviewed by medical clearance.  Coags day of surgery- reviewed, WNL.

## 2020-07-17 ENCOUNTER — TRANSCRIPTION ENCOUNTER (OUTPATIENT)
Age: 85
End: 2020-07-17

## 2020-07-17 LAB
A1C WITH ESTIMATED AVERAGE GLUCOSE RESULT: 4.9 % — SIGNIFICANT CHANGE UP (ref 4–5.6)
ANION GAP SERPL CALC-SCNC: 14 MMOL/L — SIGNIFICANT CHANGE UP (ref 5–17)
ANION GAP SERPL CALC-SCNC: 9 MMOL/L — SIGNIFICANT CHANGE UP (ref 5–17)
BASE EXCESS BLDA CALC-SCNC: 1.1 MMOL/L — SIGNIFICANT CHANGE UP (ref -2–3)
BASE EXCESS BLDA CALC-SCNC: 2.4 MMOL/L — SIGNIFICANT CHANGE UP (ref -2–3)
BASE EXCESS BLDA CALC-SCNC: 3.2 MMOL/L — HIGH (ref -2–3)
BUN SERPL-MCNC: 15 MG/DL — SIGNIFICANT CHANGE UP (ref 7–23)
BUN SERPL-MCNC: 16 MG/DL — SIGNIFICANT CHANGE UP (ref 7–23)
CALCIUM SERPL-MCNC: 8 MG/DL — LOW (ref 8.4–10.5)
CALCIUM SERPL-MCNC: 8.3 MG/DL — LOW (ref 8.4–10.5)
CHLORIDE SERPL-SCNC: 93 MMOL/L — LOW (ref 96–108)
CHLORIDE SERPL-SCNC: 97 MMOL/L — SIGNIFICANT CHANGE UP (ref 96–108)
CO2 SERPL-SCNC: 24 MMOL/L — SIGNIFICANT CHANGE UP (ref 22–31)
CO2 SERPL-SCNC: 25 MMOL/L — SIGNIFICANT CHANGE UP (ref 22–31)
CREAT SERPL-MCNC: 0.59 MG/DL — SIGNIFICANT CHANGE UP (ref 0.5–1.3)
CREAT SERPL-MCNC: 0.6 MG/DL — SIGNIFICANT CHANGE UP (ref 0.5–1.3)
ESTIMATED AVERAGE GLUCOSE: 94 MG/DL — SIGNIFICANT CHANGE UP (ref 68–114)
GLUCOSE BLDC GLUCOMTR-MCNC: 102 MG/DL — HIGH (ref 70–99)
GLUCOSE BLDC GLUCOMTR-MCNC: 127 MG/DL — HIGH (ref 70–99)
GLUCOSE BLDC GLUCOMTR-MCNC: 129 MG/DL — HIGH (ref 70–99)
GLUCOSE BLDC GLUCOMTR-MCNC: 146 MG/DL — HIGH (ref 70–99)
GLUCOSE SERPL-MCNC: 112 MG/DL — HIGH (ref 70–99)
GLUCOSE SERPL-MCNC: 168 MG/DL — HIGH (ref 70–99)
HCO3 BLDA-SCNC: 26 MMOL/L — SIGNIFICANT CHANGE UP (ref 21–28)
HCO3 BLDA-SCNC: 26 MMOL/L — SIGNIFICANT CHANGE UP (ref 21–28)
HCO3 BLDA-SCNC: 28 MMOL/L — SIGNIFICANT CHANGE UP (ref 21–28)
HCT VFR BLD CALC: 29 % — LOW (ref 34.5–45)
HCT VFR BLD CALC: 30.9 % — LOW (ref 34.5–45)
HGB BLD-MCNC: 10.1 G/DL — LOW (ref 11.5–15.5)
HGB BLD-MCNC: 9.5 G/DL — LOW (ref 11.5–15.5)
LACTATE SERPL-SCNC: 1.1 MMOL/L — SIGNIFICANT CHANGE UP (ref 0.5–2)
LACTATE SERPL-SCNC: 2.2 MMOL/L — HIGH (ref 0.5–2)
MAGNESIUM SERPL-MCNC: 1.5 MG/DL — LOW (ref 1.6–2.6)
MAGNESIUM SERPL-MCNC: 2.5 MG/DL — SIGNIFICANT CHANGE UP (ref 1.6–2.6)
MCHC RBC-ENTMCNC: 30.3 PG — SIGNIFICANT CHANGE UP (ref 27–34)
MCHC RBC-ENTMCNC: 30.3 PG — SIGNIFICANT CHANGE UP (ref 27–34)
MCHC RBC-ENTMCNC: 32.7 GM/DL — SIGNIFICANT CHANGE UP (ref 32–36)
MCHC RBC-ENTMCNC: 32.8 GM/DL — SIGNIFICANT CHANGE UP (ref 32–36)
MCV RBC AUTO: 92.4 FL — SIGNIFICANT CHANGE UP (ref 80–100)
MCV RBC AUTO: 92.8 FL — SIGNIFICANT CHANGE UP (ref 80–100)
NRBC # BLD: 0 /100 WBCS — SIGNIFICANT CHANGE UP (ref 0–0)
NRBC # BLD: 0 /100 WBCS — SIGNIFICANT CHANGE UP (ref 0–0)
PCO2 BLDA: 34 MMHG — SIGNIFICANT CHANGE UP (ref 32–45)
PCO2 BLDA: 42 MMHG — SIGNIFICANT CHANGE UP (ref 32–45)
PCO2 BLDA: 44 MMHG — SIGNIFICANT CHANGE UP (ref 32–45)
PH BLDA: 7.39 — SIGNIFICANT CHANGE UP (ref 7.35–7.45)
PH BLDA: 7.44 — SIGNIFICANT CHANGE UP (ref 7.35–7.45)
PH BLDA: 7.5 — HIGH (ref 7.35–7.45)
PHOSPHATE SERPL-MCNC: 2.4 MG/DL — LOW (ref 2.5–4.5)
PHOSPHATE SERPL-MCNC: 3.9 MG/DL — SIGNIFICANT CHANGE UP (ref 2.5–4.5)
PLATELET # BLD AUTO: 270 K/UL — SIGNIFICANT CHANGE UP (ref 150–400)
PLATELET # BLD AUTO: 276 K/UL — SIGNIFICANT CHANGE UP (ref 150–400)
PO2 BLDA: 119 MMHG — HIGH (ref 83–108)
PO2 BLDA: 181 MMHG — HIGH (ref 83–108)
PO2 BLDA: 238 MMHG — HIGH (ref 83–108)
POTASSIUM SERPL-MCNC: 2.9 MMOL/L — CRITICAL LOW (ref 3.5–5.3)
POTASSIUM SERPL-MCNC: 4.4 MMOL/L — SIGNIFICANT CHANGE UP (ref 3.5–5.3)
POTASSIUM SERPL-SCNC: 2.9 MMOL/L — CRITICAL LOW (ref 3.5–5.3)
POTASSIUM SERPL-SCNC: 4.4 MMOL/L — SIGNIFICANT CHANGE UP (ref 3.5–5.3)
RBC # BLD: 3.14 M/UL — LOW (ref 3.8–5.2)
RBC # BLD: 3.33 M/UL — LOW (ref 3.8–5.2)
RBC # FLD: 12.6 % — SIGNIFICANT CHANGE UP (ref 10.3–14.5)
RBC # FLD: 12.7 % — SIGNIFICANT CHANGE UP (ref 10.3–14.5)
SAO2 % BLDA: 100 % — SIGNIFICANT CHANGE UP (ref 95–100)
SAO2 % BLDA: 98 % — SIGNIFICANT CHANGE UP (ref 95–100)
SAO2 % BLDA: 99 % — SIGNIFICANT CHANGE UP (ref 95–100)
SODIUM SERPL-SCNC: 130 MMOL/L — LOW (ref 135–145)
SODIUM SERPL-SCNC: 132 MMOL/L — LOW (ref 135–145)
TROPONIN T SERPL-MCNC: <0.01 NG/ML — SIGNIFICANT CHANGE UP (ref 0–0.01)
WBC # BLD: 11.99 K/UL — HIGH (ref 3.8–10.5)
WBC # BLD: 14.13 K/UL — HIGH (ref 3.8–10.5)
WBC # FLD AUTO: 11.99 K/UL — HIGH (ref 3.8–10.5)
WBC # FLD AUTO: 14.13 K/UL — HIGH (ref 3.8–10.5)

## 2020-07-17 PROCEDURE — 71045 X-RAY EXAM CHEST 1 VIEW: CPT | Mod: 26

## 2020-07-17 PROCEDURE — 99233 SBSQ HOSP IP/OBS HIGH 50: CPT | Mod: GC

## 2020-07-17 PROCEDURE — 99291 CRITICAL CARE FIRST HOUR: CPT

## 2020-07-17 RX ORDER — LANOLIN ALCOHOL/MO/W.PET/CERES
3 CREAM (GRAM) TOPICAL AT BEDTIME
Refills: 0 | Status: DISCONTINUED | OUTPATIENT
Start: 2020-07-17 | End: 2020-07-22

## 2020-07-17 RX ORDER — INSULIN LISPRO 100/ML
VIAL (ML) SUBCUTANEOUS
Refills: 0 | Status: DISCONTINUED | OUTPATIENT
Start: 2020-07-17 | End: 2020-07-18

## 2020-07-17 RX ORDER — CHLORHEXIDINE GLUCONATE 213 G/1000ML
1 SOLUTION TOPICAL
Refills: 0 | Status: DISCONTINUED | OUTPATIENT
Start: 2020-07-17 | End: 2020-07-22

## 2020-07-17 RX ORDER — SODIUM CHLORIDE 9 MG/ML
500 INJECTION INTRAMUSCULAR; INTRAVENOUS; SUBCUTANEOUS ONCE
Refills: 0 | Status: COMPLETED | OUTPATIENT
Start: 2020-07-17 | End: 2020-07-17

## 2020-07-17 RX ORDER — POLYETHYLENE GLYCOL 3350 17 G/17G
17 POWDER, FOR SOLUTION ORAL DAILY
Refills: 0 | Status: DISCONTINUED | OUTPATIENT
Start: 2020-07-17 | End: 2020-07-22

## 2020-07-17 RX ORDER — ACETAMINOPHEN 500 MG
650 TABLET ORAL EVERY 6 HOURS
Refills: 0 | Status: DISCONTINUED | OUTPATIENT
Start: 2020-07-17 | End: 2020-07-22

## 2020-07-17 RX ORDER — SODIUM CHLORIDE 9 MG/ML
1000 INJECTION INTRAMUSCULAR; INTRAVENOUS; SUBCUTANEOUS
Refills: 0 | Status: DISCONTINUED | OUTPATIENT
Start: 2020-07-17 | End: 2020-07-17

## 2020-07-17 RX ORDER — DEXTROSE 50 % IN WATER 50 %
12.5 SYRINGE (ML) INTRAVENOUS ONCE
Refills: 0 | Status: DISCONTINUED | OUTPATIENT
Start: 2020-07-17 | End: 2020-07-18

## 2020-07-17 RX ORDER — CELECOXIB 200 MG/1
200 CAPSULE ORAL
Refills: 0 | Status: DISCONTINUED | OUTPATIENT
Start: 2020-07-17 | End: 2020-07-22

## 2020-07-17 RX ORDER — DEXTROSE 50 % IN WATER 50 %
25 SYRINGE (ML) INTRAVENOUS ONCE
Refills: 0 | Status: DISCONTINUED | OUTPATIENT
Start: 2020-07-17 | End: 2020-07-18

## 2020-07-17 RX ORDER — SODIUM CHLORIDE 9 MG/ML
1000 INJECTION, SOLUTION INTRAVENOUS
Refills: 0 | Status: DISCONTINUED | OUTPATIENT
Start: 2020-07-17 | End: 2020-07-18

## 2020-07-17 RX ORDER — CHLORHEXIDINE GLUCONATE 213 G/1000ML
15 SOLUTION TOPICAL EVERY 12 HOURS
Refills: 0 | Status: DISCONTINUED | OUTPATIENT
Start: 2020-07-17 | End: 2020-07-17

## 2020-07-17 RX ORDER — PANTOPRAZOLE SODIUM 20 MG/1
40 TABLET, DELAYED RELEASE ORAL EVERY 24 HOURS
Refills: 0 | Status: DISCONTINUED | OUTPATIENT
Start: 2020-07-17 | End: 2020-07-22

## 2020-07-17 RX ORDER — ASPIRIN/CALCIUM CARB/MAGNESIUM 324 MG
81 TABLET ORAL DAILY
Refills: 0 | Status: DISCONTINUED | OUTPATIENT
Start: 2020-07-17 | End: 2020-07-22

## 2020-07-17 RX ORDER — MAGNESIUM SULFATE 500 MG/ML
2 VIAL (ML) INJECTION EVERY 4 HOURS
Refills: 0 | Status: COMPLETED | OUTPATIENT
Start: 2020-07-17 | End: 2020-07-17

## 2020-07-17 RX ORDER — SENNA PLUS 8.6 MG/1
2 TABLET ORAL AT BEDTIME
Refills: 0 | Status: DISCONTINUED | OUTPATIENT
Start: 2020-07-17 | End: 2020-07-22

## 2020-07-17 RX ORDER — FENTANYL CITRATE 50 UG/ML
0.5 INJECTION INTRAVENOUS
Qty: 2500 | Refills: 0 | Status: DISCONTINUED | OUTPATIENT
Start: 2020-07-17 | End: 2020-07-17

## 2020-07-17 RX ORDER — GLUCAGON INJECTION, SOLUTION 0.5 MG/.1ML
1 INJECTION, SOLUTION SUBCUTANEOUS ONCE
Refills: 0 | Status: DISCONTINUED | OUTPATIENT
Start: 2020-07-17 | End: 2020-07-18

## 2020-07-17 RX ORDER — POTASSIUM CHLORIDE 20 MEQ
10 PACKET (EA) ORAL
Refills: 0 | Status: COMPLETED | OUTPATIENT
Start: 2020-07-17 | End: 2020-07-17

## 2020-07-17 RX ORDER — DEXTROSE 50 % IN WATER 50 %
15 SYRINGE (ML) INTRAVENOUS ONCE
Refills: 0 | Status: DISCONTINUED | OUTPATIENT
Start: 2020-07-17 | End: 2020-07-18

## 2020-07-17 RX ORDER — POTASSIUM CHLORIDE 20 MEQ
40 PACKET (EA) ORAL ONCE
Refills: 0 | Status: COMPLETED | OUTPATIENT
Start: 2020-07-17 | End: 2020-07-17

## 2020-07-17 RX ORDER — NOREPINEPHRINE BITARTRATE/D5W 8 MG/250ML
0.05 PLASTIC BAG, INJECTION (ML) INTRAVENOUS
Qty: 8 | Refills: 0 | Status: DISCONTINUED | OUTPATIENT
Start: 2020-07-17 | End: 2020-07-17

## 2020-07-17 RX ADMIN — FENTANYL CITRATE 2.5 MICROGRAM(S)/KG/HR: 50 INJECTION INTRAVENOUS at 00:42

## 2020-07-17 RX ADMIN — Medication 3 MILLIGRAM(S): at 21:29

## 2020-07-17 RX ADMIN — SODIUM CHLORIDE 70 MILLILITER(S): 9 INJECTION INTRAMUSCULAR; INTRAVENOUS; SUBCUTANEOUS at 09:19

## 2020-07-17 RX ADMIN — CHLORHEXIDINE GLUCONATE 15 MILLILITER(S): 213 SOLUTION TOPICAL at 06:27

## 2020-07-17 RX ADMIN — Medication 40 MILLIEQUIVALENT(S): at 02:23

## 2020-07-17 RX ADMIN — Medication 62.5 MILLIMOLE(S): at 09:20

## 2020-07-17 RX ADMIN — Medication 50 GRAM(S): at 02:23

## 2020-07-17 RX ADMIN — SODIUM CHLORIDE 500 MILLILITER(S): 9 INJECTION INTRAMUSCULAR; INTRAVENOUS; SUBCUTANEOUS at 04:15

## 2020-07-17 RX ADMIN — SODIUM CHLORIDE 100 MILLILITER(S): 9 INJECTION INTRAMUSCULAR; INTRAVENOUS; SUBCUTANEOUS at 06:27

## 2020-07-17 RX ADMIN — APIXABAN 2.5 MILLIGRAM(S): 2.5 TABLET, FILM COATED ORAL at 17:15

## 2020-07-17 RX ADMIN — SODIUM CHLORIDE 100 MILLILITER(S): 9 INJECTION INTRAMUSCULAR; INTRAVENOUS; SUBCUTANEOUS at 00:40

## 2020-07-17 RX ADMIN — CELECOXIB 200 MILLIGRAM(S): 200 CAPSULE ORAL at 09:19

## 2020-07-17 RX ADMIN — CELECOXIB 200 MILLIGRAM(S): 200 CAPSULE ORAL at 17:45

## 2020-07-17 RX ADMIN — Medication 81 MILLIGRAM(S): at 13:26

## 2020-07-17 RX ADMIN — Medication 100 MILLIEQUIVALENT(S): at 02:52

## 2020-07-17 RX ADMIN — CELECOXIB 200 MILLIGRAM(S): 200 CAPSULE ORAL at 17:15

## 2020-07-17 RX ADMIN — Medication 100 MILLIGRAM(S): at 05:47

## 2020-07-17 RX ADMIN — SODIUM CHLORIDE 40 MILLILITER(S): 9 INJECTION INTRAMUSCULAR; INTRAVENOUS; SUBCUTANEOUS at 12:27

## 2020-07-17 RX ADMIN — HYDROMORPHONE HYDROCHLORIDE 0.5 MILLIGRAM(S): 2 INJECTION INTRAMUSCULAR; INTRAVENOUS; SUBCUTANEOUS at 09:19

## 2020-07-17 RX ADMIN — PANTOPRAZOLE SODIUM 40 MILLIGRAM(S): 20 TABLET, DELAYED RELEASE ORAL at 06:27

## 2020-07-17 RX ADMIN — Medication 100 MILLIEQUIVALENT(S): at 04:09

## 2020-07-17 RX ADMIN — Medication 100 MILLIEQUIVALENT(S): at 01:31

## 2020-07-17 RX ADMIN — PROPOFOL 5 MICROGRAM(S)/KG/MIN: 10 INJECTION, EMULSION INTRAVENOUS at 00:39

## 2020-07-17 RX ADMIN — Medication 650 MILLIGRAM(S): at 09:19

## 2020-07-17 RX ADMIN — Medication 650 MILLIGRAM(S): at 20:31

## 2020-07-17 RX ADMIN — APIXABAN 2.5 MILLIGRAM(S): 2.5 TABLET, FILM COATED ORAL at 07:15

## 2020-07-17 RX ADMIN — OXYCODONE HYDROCHLORIDE 10 MILLIGRAM(S): 5 TABLET ORAL at 09:19

## 2020-07-17 RX ADMIN — Medication 50 GRAM(S): at 06:27

## 2020-07-17 RX ADMIN — Medication 4.68 MICROGRAM(S)/KG/MIN: at 00:39

## 2020-07-17 RX ADMIN — CHLORHEXIDINE GLUCONATE 1 APPLICATION(S): 213 SOLUTION TOPICAL at 13:32

## 2020-07-17 RX ADMIN — Medication 650 MILLIGRAM(S): at 22:00

## 2020-07-17 NOTE — PHYSICAL THERAPY INITIAL EVALUATION ADULT - CRITERIA FOR SKILLED THERAPEUTIC INTERVENTIONS
rehab potential/therapy frequency/functional limitations in following categories/risk reduction/prevention/impairments found/anticipated discharge recommendation

## 2020-07-17 NOTE — PROVIDER CONTACT NOTE (CHANGE IN STATUS NOTIFICATION) - ASSESSMENT
8 Melony VYAS rounded on pt and brought my attention to pt change in status. pt appeared asleep, mouth breathing; labored on 4L nasal canula.  awaiting CPAP for JENSEN. pt diaphoretic. unresponsive to sternal rub. Pt appeared asleep, mouth breathing; labored on 4L nasal canula.  awaiting CPAP for JENSEN. pt diaphoretic. unresponsive to sternal rub.  /59, , AFib on monitor, O2 sat 40s on 4 L NC O2, RR 25, Rectal temp 98.7, .   Rapid Response team at bedside.

## 2020-07-17 NOTE — PHYSICAL THERAPY INITIAL EVALUATION ADULT - GENERAL OBSERVATIONS, REHAB EVAL
Patient received in semi-florez position, no apparent distress, +2LNC, + radial arterial line, +telemetry, +hep lock, +sequential pneumatic compression device.

## 2020-07-17 NOTE — PHYSICAL THERAPY INITIAL EVALUATION ADULT - ADDITIONAL COMMENTS
Pt lives on second floor of 2 family home with sister downstairs with stair lift. Pt as of late has been transferring to a wheelchair for mobility or can walk short distances in the home. Pt receives assistance from a cleaning lady and has groceries delivered.

## 2020-07-17 NOTE — DISCHARGE NOTE PROVIDER - NSDCMRMEDTOKEN_GEN_ALL_CORE_FT
amLODIPine 5 mg oral tablet: 1 tab(s) orally once a day  celecoxib 200 mg oral capsule: 1 cap(s) orally 2 times a day  Eliquis 2.5 mg oral tablet: 1 tab(s) orally 2 times a day  hydroCHLOROthiazide 12.5 mg oral tablet: 1 tab(s) orally once a day  Metoprolol Succinate ER 25 mg oral tablet, extended release: 1 tab(s) orally once a day  Nasonex 50 mcg/inh nasal spray: 2 spray(s) nasal once a day, As Needed  pantoprazole 20 mg oral delayed release tablet: 1 tab(s) orally once a day  polyethylene glycol 3350 oral powder for reconstitution: 17 gram(s) orally once a day acetaminophen 325 mg oral tablet: 2 tab(s) orally every 6 hours, As needed, Moderate Pain (4 - 6), Severe Pain (7 - 10)  amLODIPine 5 mg oral tablet: 1 tab(s) orally once a day  aspirin 81 mg oral tablet, chewable: 1 tab(s) orally once a day  celecoxib 200 mg oral capsule: 1 cap(s) orally 2 times a day  Eliquis 2.5 mg oral tablet: 1 tab(s) orally 2 times a day  ferrous sulfate 325 mg (65 mg elemental iron) oral tablet: 1 tab(s) orally once a day  hydroCHLOROthiazide 12.5 mg oral tablet: 1 tab(s) orally once a day  metoprolol succinate 25 mg oral tablet, extended release: 1 tab(s) orally 2 times a day  Nasonex 50 mcg/inh nasal spray: 2 spray(s) nasal once a day, As Needed  pantoprazole 20 mg oral delayed release tablet: 1 tab(s) orally once a day  polyethylene glycol 3350 oral powder for reconstitution: 17 gram(s) orally once a day  senna oral tablet: 2 tab(s) orally once a day (at bedtime), As needed, Constipation acetaminophen 325 mg oral tablet: 2 tab(s) orally every 6 hours, As needed, Moderate Pain (4 - 6), Severe Pain (7 - 10)  amLODIPine 5 mg oral tablet: 1 tab(s) orally once a day  aspirin 81 mg oral tablet, chewable: 1 tab(s) orally once a day  celecoxib 200 mg oral capsule: 1 cap(s) orally 2 times a day  Eliquis 2.5 mg oral tablet: 1 tab(s) orally 2 times a day  ferrous sulfate 325 mg (65 mg elemental iron) oral tablet: 1 tab(s) orally once a day  metoprolol succinate 25 mg oral tablet, extended release: 1 tab(s) orally 2 times a day  Nasonex 50 mcg/inh nasal spray: 2 spray(s) nasal once a day, As Needed  pantoprazole 20 mg oral delayed release tablet: 1 tab(s) orally once a day  polyethylene glycol 3350 oral powder for reconstitution: 17 gram(s) orally once a day  senna oral tablet: 2 tab(s) orally once a day (at bedtime), As needed, Constipation acetaminophen 325 mg oral tablet: 2 tab(s) orally every 6 hours, As needed, Moderate Pain (4 - 6), Severe Pain (7 - 10)  amLODIPine 10 mg oral tablet: 1 tab(s) orally once a day  aspirin 81 mg oral tablet, chewable: 1 tab(s) orally once a day  celecoxib 200 mg oral capsule: 1 cap(s) orally 2 times a day  Eliquis 2.5 mg oral tablet: 1 tab(s) orally 2 times a day  ferrous sulfate 325 mg (65 mg elemental iron) oral tablet: 1 tab(s) orally once a day  metoprolol succinate 25 mg oral tablet, extended release: 1 tab(s) orally 2 times a day  Nasonex 50 mcg/inh nasal spray: 2 spray(s) nasal once a day, As Needed  pantoprazole 20 mg oral delayed release tablet: 1 tab(s) orally once a day  polyethylene glycol 3350 oral powder for reconstitution: 17 gram(s) orally once a day  senna oral tablet: 2 tab(s) orally once a day (at bedtime), As needed, Constipation

## 2020-07-17 NOTE — PROVIDER CONTACT NOTE (CHANGE IN STATUS NOTIFICATION) - SITUATION
pt s/p LTHA. 8 Melony VYAS rounded on pt and brought my attention to pt change in status.  Patient was diaphoretic and unresponsive to sternal rub.  Pt was rapid AFib HR up to 150s on monitor, denies complaints of discomfort.  Rapid response called for safety and staff concern.

## 2020-07-17 NOTE — PHYSICAL THERAPY INITIAL EVALUATION ADULT - PERTINENT HX OF CURRENT PROBLEM, REHAB EVAL
84F with left hip pain x years that was controlled with conservative tx. Pt started using a walker in June and quickly required a wheelchair over the last month.

## 2020-07-17 NOTE — CHART NOTE - NSCHARTNOTEFT_GEN_A_CORE
Upon Nutritional Assessment by the Registered Dietitian your patient was determined to meet criteria / has evidence of the following diagnosis/diagnoses:          [ ]  Mild Protein Calorie Malnutrition        [ ]  Moderate Protein Calorie Malnutrition        [ x] Severe Protein Calorie Malnutrition        [ ] Unspecified Protein Calorie Malnutrition        [ x] Underweight / BMI <19        [ ] Morbid Obesity / BMI > 40      Findings as based on:  •  Comprehensive nutrition assessment and consultation  •  Calorie counts (nutrient intake analysis)  •  Food acceptance and intake status from observations by staff  •  Follow up  •  Patient education  •  Intervention secondary to interdisciplinary rounds  •   concerns    Pt reported weight loss over the last year. Was 150lbs (10 yrs ago), 127 (1.5yrs ago), and now 110lbs. Pt attributes recent weight loss to hip issues and being in pain/loss of appetite. This indicates a 13.3% wt change x1.5yrs. Pt noted to have severe muscle wasting most notable by protruding acromion process and hallowing of temporals.      Treatment:    The following diet has been recommended:  1. Recommend adding on EnsureEnlive TID (1050kcal, 60g pro)  2. Encourage ONS between meals  3. MVI daily  4. Obtain updated weights for accuracy and trending.       PROVIDER Section:     By signing this assessment you are acknowledging and agree with the diagnosis/diagnoses assigned by the Registered Dietitian    Comments:

## 2020-07-17 NOTE — PHYSICAL THERAPY INITIAL EVALUATION ADULT - MANUAL MUSCLE TESTING RESULTS, REHAB EVAL
NEW PATIENT VISIT PRE-VISIT PLANNING    1.  [employer] Patient is checked in Patient Demographics? NO    2.  Immunizations were updated in [facility] using WebIZ?: Yes       •  Web Iz Recommendations: FLU, HEPATITIS A , TD and VARICELLA (Chicken Pox)     3.  Is this appointment scheduled as a Hospital Follow-Up? No    4.  Patient is due for the following Health Maintenance Topics:   Health Maintenance Due   Topic Date Due   • IMM DTaP/Tdap/Td Vaccine (1 - Tdap) 01/11/1985   • COLONOSCOPY  01/11/2016           5.  Reviewed/Updated the following with patient:       •   Preferred Pharmacy? NO       •   Preferred Lab? NO       •   Medications? NO       •   Social History? NO       •   Family History? NO    6.  Updated Care Team?       •   DME Company (gait device, O2, CPAP, etc.) NO       •   Other Specialists (eye doctor, derm, GYN, cardiology, endo, etc): NO    7.  Patient was informed to arrive 15 min prior to their scheduled appointment and bring in their medication bottles.  
However, functional weakness observed/no strength deficits were identified

## 2020-07-17 NOTE — PROVIDER CONTACT NOTE (CHANGE IN STATUS NOTIFICATION) - RECOMMENDATIONS
Rapid Response called. Sedated and Intubated at bedside. See RRT flowsheet. moved to higher level of care. Call Rapid Response and notify team

## 2020-07-17 NOTE — PROCEDURE NOTE - NSPROCDETAILS_GEN_ALL_CORE
connected to a pressurized flush line/location identified, draped/prepped, sterile technique used, needle inserted/introduced/sutured in place/all materials/supplies accounted for at end of procedure/positive blood return obtained via catheter/hemostasis with direct pressure, dressing applied/Seldinger technique

## 2020-07-17 NOTE — PROGRESS NOTE ADULT - SUBJECTIVE AND OBJECTIVE BOX
Interval Events:  admitted overnight kept intubated, ct for PE protocol and CT head was negative. Given 500 cc bolus for decreased UO .   Patient seen and examined at bedside.      Allergies    No Known Allergies    Intolerances        Vital Signs Last 24 Hrs  T(C): 36.7 (17 Jul 2020 05:33), Max: 37.1 (16 Jul 2020 22:00)  T(F): 98 (17 Jul 2020 05:33), Max: 98.7 (16 Jul 2020 22:00)  HR: 78 (17 Jul 2020 06:00) (64 - 156)  BP: 151/70 (17 Jul 2020 06:00) (84/51 - 166/71)  BP(mean): 100 (17 Jul 2020 06:00) (73 - 101)  RR: 15 (17 Jul 2020 06:00) (11 - 82)  SpO2: 100% (17 Jul 2020 06:00) (82% - 100%)    07-16 @ 07:01  -  07-17 @ 06:59  --------------------------------------------------------  IN: 2334 mL / OUT: 775 mL / NET: 1559 mL      07-16 @ 07:01 - 07-17 @ 06:59  --------------------------------------------------------  IN: 2334 mL / OUT: 775 mL / NET: 1559 mL        Physical Exam:     Gen: NAD well nourished  Neuro: A&OX3 No deficits  CV:RRR Reg s1s2 noM  Pulm: CTA b/l No w/r/r  Abd: Soft NT ND + BS  Ext: No C/C/E   Vasc: + DP b/l   Skin: no rashes noted  MSK: No joint swelling  Psych: No signs of anxiety or depression      LABS:  ABG - ( 17 Jul 2020 05:56 )  pH, Arterial: 7.50  pH, Blood: x     /  pCO2: 34    /  pO2: 181   / HCO3: 26    / Base Excess: 2.4   /  SaO2: 99                  CBC Full  -  ( 17 Jul 2020 05:46 )  WBC Count : 11.99 K/uL  RBC Count : 3.14 M/uL  Hemoglobin : 9.5 g/dL  Hematocrit : 29.0 %  Platelet Count - Automated : 276 K/uL  Mean Cell Volume : 92.4 fl  Mean Cell Hemoglobin : 30.3 pg  Mean Cell Hemoglobin Concentration : 32.8 gm/dL  Auto Neutrophil # : x  Auto Lymphocyte # : x  Auto Monocyte # : x  Auto Eosinophil # : x  Auto Basophil # : x  Auto Neutrophil % : x  Auto Lymphocyte % : x  Auto Monocyte % : x  Auto Eosinophil % : x  Auto Basophil % : x    07-17    130<L>  |  97  |  15  ----------------------------<  112<H>  4.4   |  24  |  0.60    Ca    8.0<L>      17 Jul 2020 05:46  Phos  2.4     07-17  Mg     2.5     07-17    TPro  6.6  /  Alb  3.3  /  TBili  0.4  /  DBili  x   /  AST  38  /  ALT  23  /  AlkPhos  88  07-16    PT/INR - ( 16 Jul 2020 09:14 )   PT: 13.2 sec;   INR: 1.11          PTT - ( 16 Jul 2020 09:14 )  PTT:27.9 sec                RADIOLOGY & ADDITIONAL STUDIES (The following images were personally reviewed):          A/p: 84F PMHx afib (on eliquis), HTN, JENSEN, OA/RA, b/l hand/knee/feet surgery, recently s/p R OLIVIA (5/17) presented to [facility] for elective L total hip replacement today. Now POD#0 s/p L OLIVIA c/b afib with RVR in PACU, found to be unresponsive on floors now s/p emergent intubation. Transferred to SICU for further management and hemodynamic monitoring.    NEURO: fentanyl, propofol wean sedation for possible extubation this am  CV: goal MAP >65, h/o afib on eliquis, RVR 160s with hypotension prior to intubation Trop neg x 1, c/w eliquis, asa  PULM: VC/AC /16/5/ 50% wean to extubate today   GI/FEN: NPO, NS@100, PPI  : Roladn, voids  ENDO: ISS  HEME: Hgb 11.1 postop  ID: WBC 20.7 postop. Ancef (x2 doses)  PPx: SCDs  LINES: PIVx2, R radial a line  WOUNDS/DRAINS: L hip incision site  PT/OT: Not ordered Interval Events:  admitted overnight kept intubated, ct for PE protocol and CT head was negative. Given 500 cc bolus for decreased UO .   Patient seen and examined at bedside.      Allergies    No Known Allergies    Intolerances        Vital Signs Last 24 Hrs  T(C): 36.7 (17 Jul 2020 05:33), Max: 37.1 (16 Jul 2020 22:00)  T(F): 98 (17 Jul 2020 05:33), Max: 98.7 (16 Jul 2020 22:00)  HR: 78 (17 Jul 2020 06:00) (64 - 156)  BP: 151/70 (17 Jul 2020 06:00) (84/51 - 166/71)  BP(mean): 100 (17 Jul 2020 06:00) (73 - 101)  RR: 15 (17 Jul 2020 06:00) (11 - 82)  SpO2: 100% (17 Jul 2020 06:00) (82% - 100%)    07-16 @ 07:01  -  07-17 @ 06:59  --------------------------------------------------------  IN: 2334 mL / OUT: 775 mL / NET: 1559 mL      07-16 @ 07:01 - 07-17 @ 06:59  --------------------------------------------------------  IN: 2334 mL / OUT: 775 mL / NET: 1559 mL        Physical Exam:     Gen: NAD well nourished  Neuro: A&OX3 No deficits  CV: RRR Reg s1s2 no M  Pulm: CTA b/l No w/r/r  Abd: Soft NT ND + BS  Ext: No C/C/E + Left hip dressing noted. Ext warm well perfused.   Skin: no rashes noted  MSK: + RA changes noted on upper and Lower ext and digits.   Psych: No signs of anxiety or depression      LABS:  ABG - ( 17 Jul 2020 05:56 )  pH, Arterial: 7.50  pH, Blood: x     /  pCO2: 34    /  pO2: 181   / HCO3: 26    / Base Excess: 2.4   /  SaO2: 99                  CBC Full  -  ( 17 Jul 2020 05:46 )  WBC Count : 11.99 K/uL  RBC Count : 3.14 M/uL  Hemoglobin : 9.5 g/dL  Hematocrit : 29.0 %  Platelet Count - Automated : 276 K/uL  Mean Cell Volume : 92.4 fl  Mean Cell Hemoglobin : 30.3 pg  Mean Cell Hemoglobin Concentration : 32.8 gm/dL  Auto Neutrophil # : x  Auto Lymphocyte # : x  Auto Monocyte # : x  Auto Eosinophil # : x  Auto Basophil # : x  Auto Neutrophil % : x  Auto Lymphocyte % : x  Auto Monocyte % : x  Auto Eosinophil % : x  Auto Basophil % : x    07-17    130<L>  |  97  |  15  ----------------------------<  112<H>  4.4   |  24  |  0.60    Ca    8.0<L>      17 Jul 2020 05:46  Phos  2.4     07-17  Mg     2.5     07-17    TPro  6.6  /  Alb  3.3  /  TBili  0.4  /  DBili  x   /  AST  38  /  ALT  23  /  AlkPhos  88  07-16    PT/INR - ( 16 Jul 2020 09:14 )   PT: 13.2 sec;   INR: 1.11          PTT - ( 16 Jul 2020 09:14 )  PTT:27.9 sec                RADIOLOGY & ADDITIONAL STUDIES (The following images were personally reviewed):          A/p: 84F PMHx afib (on eliquis), HTN, JENSEN, OA/RA, b/l hand/knee/feet surgery, recently s/p R OLIVIA (5/17) presented to [facility] for elective L total hip replacement today. Now POD#0 s/p L OLIVIA c/b afib with RVR in PACU, found to be unresponsive on floors now s/p emergent intubation. Transferred to SICU for further management and hemodynamic monitoring.    NEURO: fentanyl, propofol wean sedation for possible extubation this am  CV: goal MAP >65, h/o afib on eliquis, RVR 160s with hypotension prior to intubation Trop neg x 1, c/w eliquis, asa  PULM: VC/AC /16/5/ 50% wean to extubate today   GI/FEN: NPO, NS@100, PPI start diet post extubation   : Roland, voids  ENDO: ISS  ID: WBC 20.7 postop however now improving . Ancef (x2 doses)  PPx: SCDs on Eliquis   LINES: PIVx2, R radial a line  WOUNDS/DRAINS: L hip incision site  PT/OT: Not ordered

## 2020-07-17 NOTE — PROCEDURE NOTE - NSINDICATIONS_GEN_A_CORE
monitoring purposes/blood sampling/critical patient/cannulation purposes/arterial puncture to obtain ABG's

## 2020-07-17 NOTE — DISCHARGE NOTE PROVIDER - CARE PROVIDER_API CALL
Rojas Damon)  Orthopaedic Surgery  [address]  [place], [place] [id number]  Phone: [phone]  Fax: [phone]  Follow Up Time: 2 weeks

## 2020-07-17 NOTE — PROVIDER CONTACT NOTE (CHANGE IN STATUS NOTIFICATION) - ACTION/TREATMENT ORDERED:
Pt was sedated and Intubated at bedside. See RRT flowsheet in chart.  Pt moved to higher level of care ([address]) accompanied on monitor by team.

## 2020-07-17 NOTE — CHART NOTE - NSCHARTNOTEFT_GEN_A_CORE
Admitting Diagnosis:   Patient is a 84y old  Female who presents with a chief complaint of left hip pain (17 Jul 2020 14:36)      Consult: Yes [   ]  No [  x ]    Reason for Initial Nutrition Assessment:  ICU Length Of Stay     PAST MEDICAL & SURGICAL HISTORY:  Sleep apnea: does not use cpap  Seasonal allergies  Osteoporosis  Anemia  Rheumatoid arthritis  Osteoarthritis  UTI (urinary tract infection)  Hip pain  Dysuria  Stress incontinence, female  Renal insufficiency  GERD (gastroesophageal reflux disease)  HTN (hypertension)  Afib  History of nasal surgery  History of cholecystectomy  History of appendectomy  History of surgery: b/l feet with pins  History of surgery: b/l hand reconstruction x2  with plastic hardware b/l  H/O total knee replacement, bilateral      Current Nutrition Order:  Regular     PO Intake: Excellent (%) [   ]  Good (50-75%) [   ]  Fair (25-50%) [   ]  Poor (<25%) [   ] N/A had yet to begin eating    GI Issues:   Denies N/V  No BM this admission  Ordered for protonix, not on bowel regimen    Pain:  no pain reported    Skin Integrity:  darian score 13  intact pressure wise    Labs:   07-17    130<L>  |  97  |  15  ----------------------------<  112<H>  4.4   |  24  |  0.60    Ca    8.0<L>      17 Jul 2020 05:46  Phos  2.4     07-17  Mg     2.5     07-17    TPro  6.6  /  Alb  3.3  /  TBili  0.4  /  DBili  x   /  AST  38  /  ALT  23  /  AlkPhos  88  07-16    CAPILLARY BLOOD GLUCOSE      POCT Blood Glucose.: 129 mg/dL (17 Jul 2020 13:17)  POCT Blood Glucose.: 102 mg/dL (17 Jul 2020 06:06)  POCT Blood Glucose.: 256 mg/dL (16 Jul 2020 21:45)    Nutritionally Pertinent Lab Values:  POCT 102, 256, Na 130, 132, 129, phos 2.3    Medications:  MEDICATIONS  (STANDING):  apixaban 2.5 milliGRAM(s) Oral every 12 hours  aspirin  chewable 81 milliGRAM(s) Oral daily  celecoxib 200 milliGRAM(s) Oral two times a day  chlorhexidine 2% Cloths 1 Application(s) Topical <User Schedule>  dextrose 5%. 1000 milliLiter(s) (50 mL/Hr) IV Continuous <Continuous>  dextrose 50% Injectable 12.5 Gram(s) IV Push once  dextrose 50% Injectable 25 Gram(s) IV Push once  dextrose 50% Injectable 25 Gram(s) IV Push once  insulin lispro (HumaLOG) corrective regimen sliding scale   SubCutaneous Before meals and at bedtime  pantoprazole  Injectable 40 milliGRAM(s) IV Push every 24 hours  polyethylene glycol 3350 17 Gram(s) Oral daily  sodium chloride 0.9%. 1000 milliLiter(s) (40 mL/Hr) IV Continuous <Continuous>    MEDICATIONS  (PRN):  acetaminophen   Tablet .. 650 milliGRAM(s) Oral every 6 hours PRN Moderate Pain (4 - 6), Severe Pain (7 - 10)  dextrose 40% Gel 15 Gram(s) Oral once PRN Blood Glucose LESS THAN 70 milliGRAM(s)/deciliter  glucagon  Injectable 1 milliGRAM(s) IntraMuscular once PRN Glucose LESS THAN 70 milligrams/deciliter  senna 2 Tablet(s) Oral at bedtime PRN Constipation      Admitted Anthropometrics:  Height: 5'5", IBW 125lbs+/-10%, %IBW 88%, BMI 18.3     Weight: 110lbs (7/16)    Daily     Weight Change:   Of note pt reported weight loss over the last year. Was 150lbs (10 yrs ago), 127 (1.5yrs ago), and now 110lbs. Pt attributes recent weight loss to hip issues and being in pain/loss of appetite. This indicates a 13.3% wt change x1.5yrs. Pt noted to have severe muscle wasting most notable by protruding acromion process and hallowing of temporals.  Nutrition Focused Physical Exam: Completed [  x ]  Unable to complete [   ]    Estimated energy needs:   ABW (50kg) used for calculations as pt between % of IBW.   Nutrient needs based on [facility] standards of care for maintenance in older adults.  Needs adjusted for age, post-op healing, suspected severe PCM  1250-1500kcal/day (25-30kcal/kg)  70-80g pro/day (1.4-1.6g pro/kg)  Fluids per team    Subjective:   84F PMHx afib (on eliquis), HTN, JENSEN, OA/RA, b/l hand/knee/feet surgery, recently s/p R OLIVIA (5/17) presented to [facility] for elective L total hip replacement today. Now POD#0 s/p L OLIVIA c/b afib with RVR in PACU, found to be unresponsive on floors now s/p emergent intubation. Transferred to SICU for further management and hemodynamic monitoring. Pt was weaned off pressors, sedatives and extubated to aerosol mask this am. Pt seen in room, resting in bed. Alert and conversational. Was advanced to a regular diet, pending bedside dysphagia screen. Denies GI distress, no N/V, last BM was prior to admission 7/15. Of note pt reported weight loss over the last year. Was 150lbs (10 yrs ago), 127 (1.5yrs ago), and now 110lbs. Pt attributes recent weight loss to hip issues and being in pain/loss of appetite. Pt noted to have severe muscle wasting most notable by protruding acromium processs. States she drinks Glucernas at home and was amenable to drinking them ONS here. NKFA or dietary restrictions. Skin: surgical incision,  darian score 13; GI: WNL per flowsheet. Notable labs: Na 130, phos 2.4. Please monitor and replete lytes. RD to follow.     Nutrition Diagnosis:  increased nutrient needs RT increased demand for kcal/pro AEB age, post-op healing, suspected malnutrition    Goal:  Pt to consistently meet % of estimated needs PO     Recommendations:  1. Recommend adding on EnsureEnlive TID (1050kcal, 60g pro)  2. Encourage ONS between meals  3. MVI daily  4. Obtain updated weights for accuracy and trending.     Education:   option of ONS, diet advancement process.     Risk Level: High [ x  ] Moderate [   ] Low [   ]

## 2020-07-17 NOTE — CONSULT NOTE ADULT - SUBJECTIVE AND OBJECTIVE BOX
SICU CONSULT NOTE    HPI:  84F PMHx afib (on eliquis), HTN, JENSEN, OA/RA, b/l hand/knee/feet surgery, recently s/p R OLIVIA (5/17) presented to [facility] for elective L total hip replacement today. The procedure was uncomplicated and the patient tolerated the procedure well. EBL 400cc. However in PACU pt noted to be hypertensive to SBP 200s and was given 5mg IV hydralazine with no response, shortly after noted to be in afib with rvr 160s and hypertensive given 5 IV labetalol IV push stat x2 w resolution of hypertension and continued tachycardia to low 100 and 110s. When pt arrived to floor tachycardia was increase to low 120s-130s and was given her metoprolol 25. @ around 930-10pm pt noted to be unresponsive so rapid/anesthesia was called and the patient was intubated.     Patient transferred to SICU intubated and on levo, in afib w/ . /77. Pre-intubation labs notable for ABG 7.11/92/356/28/-2.9/100, Lactate 2.6, trop negative, Hgb 11.1, glucose 256. No significant pain medication doses prior to unresponsiveness. Upon arrival patient taken immediately for CT head and CT PE protocol. Prelim read negative for PE or stroke.      PAST MEDICAL & SURGICAL HISTORY:  Sleep apnea: does not use cpap  Seasonal allergies  Osteoporosis  Anemia  Rheumatoid arthritis  Osteoarthritis  UTI (urinary tract infection)  Hip pain  Dysuria  Stress incontinence, female  Renal insufficiency  GERD (gastroesophageal reflux disease)  HTN (hypertension)  Afib  History of nasal surgery  History of cholecystectomy  History of appendectomy  History of surgery: b/l feet with pins  History of surgery: b/l hand reconstruction x2  with plastic hardware b/l  H/O total knee replacement, bilateral    REVIEW OF SYSTEMS:   Pertinent positives/negatives noted in HPI.     HOME MEDICATIONS:  polyethylene glycol 3350 oral powder for reconstitution: Last Dose Taken: 09-Jul-2020 , 17 gram(s) orally once a day  · 	amLODIPine 5 mg oral tablet: Last Dose Taken: 16-Jul-2020 AM, 1 tab(s) orally once a day  · 	celecoxib 200 mg oral capsule: Last Dose Taken: 09-Jul-2020 , 1 cap(s) orally 2 times a day  · 	Metoprolol Succinate ER 25 mg oral tablet, extended release: Last Dose Taken: 12-Jul-2020 , 1 tab(s) orally once a day  · 	pantoprazole 20 mg oral delayed release tablet: Last Dose Taken: 12-Jul-2020 , 1 tab(s) orally once a day  · 	Nasonex 50 mcg/inh nasal spray: Last Dose Taken: 15-Jul-2020 PM, 2 spray(s) nasal once a day, As Needed  · 	Eliquis 2.5 mg oral tablet: Last Dose Taken: 12-Jul-2020 , 1 tab(s) orally 2 times a day  · 	hydroCHLOROthiazide 12.5 mg oral tablet: Last Dose Taken: 16-Jul-2020 AM, 1 tab(s) orally once a day    ALLERGIES:  Allergies    No Known Allergies    Intolerances    Vital Signs Last 24 Hrs  T(C): 37.1 (16 Jul 2020 22:00), Max: 37.1 (16 Jul 2020 22:00)  T(F): 98.7 (16 Jul 2020 22:00), Max: 98.7 (16 Jul 2020 22:00)  HR: 122 (16 Jul 2020 22:50) (67 - 156)  BP: 123/77 (16 Jul 2020 22:50) (84/51 - 166/71)  BP(mean): 94 (16 Jul 2020 22:50) (84 - 96)  RR: 21 (16 Jul 2020 22:50) (11 - 22)  SpO2: 100% (16 Jul 2020 22:50) (82% - 100%)    PHYSICAL EXAM:  GENERAL: Intubated sedated  HEENT: NCAT, MMM  RESP: Intubated  CARD: Normal rate on tele, Normal peripheral perfusion  GI: Soft, NT, ND, No guarding, No rebound tenderness  EXTREM: WWP, No edema, SCDs in place, L hip dressing c/d/i  NEURO: opens eyes intermittently during exam      LABS:                        11.1   20.77 )-----------( 360      ( 16 Jul 2020 22:18 )             34.7     07-16    129<L>  |  93<L>  |  16  ----------------------------<  261<H>  3.3<L>   |  24  |  0.57    Ca    8.2<L>      16 Jul 2020 22:18  Phos  6.1     07-16  Mg     1.9     07-16    TPro  6.6  /  Alb  3.3  /  TBili  0.4  /  DBili  x   /  AST  38  /  ALT  23  /  AlkPhos  88  07-16    PT/INR - ( 16 Jul 2020 09:14 )   PT: 13.2 sec;   INR: 1.11          PTT - ( 16 Jul 2020 09:14 )  PTT:27.9 sec        84F PMHx afib (on eliquis), HTN, JENSEN, OA/RA, b/l hand/knee/feet surgery, recently s/p R OLIVIA (5/17) presented to [facility] for elective L total hip replacement today. Now POD#0 s/p L OLIVIA c/b afib with RVR in PACU, found to be unresponsive on floors now s/p emergent intubation. Transferred to SICU for further management and hemodynamic monitoring.    NEURO: fentanyl, propofol  CV: goal MAP >65, h/o afib on eliquis, RVR 160s with hypotension prior to intubation  -	Trop neg x 1, c/w eliquis, asa  PULM: VC/AC , RRX, PEEPX FIO2x  -	Pre-intubation ABG 7.11/92/356/28/-2.9/100. Lactate 2.6  GI/FEN: NPO, NS@100, PPI  : Roland, voids  ENDO: ISS  HEME: Hgb 11.1 postop  ID: WBC 20.7 postop. Ancef (x2 doses)  PPx: SCDs  LINES: PIVx2, R radial a line  WOUNDS/DRAINS: L hip incision site  PT/OT: Not ordered SICU CONSULT NOTE    HPI:  84F PMHx afib (on eliquis), HTN, JENSEN, OA/RA, b/l hand/knee/feet surgery, recently s/p R OLIVIA (5/17) presented to [facility] for elective L total hip replacement today. The procedure was uncomplicated and the patient tolerated the procedure well. EBL 400cc. However in PACU pt noted to be hypertensive to SBP 200s and was given 5mg IV hydralazine with no response, shortly after noted to be in afib with rvr 160s and hypertensive given 5 IV labetalol IV push stat x2 w resolution of hypertension and continued tachycardia to low 100 and 110s. When pt arrived to floor tachycardia was increase to low 120s-130s and was given her metoprolol 25. @ around 930-10pm pt noted to be unresponsive so rapid/anesthesia was called and the patient was intubated.     Patient transferred to SICU intubated and on levo, in afib w/ . /77. Pre-intubation labs notable for ABG 7.11/92/356/28/-2.9/100, Lactate 2.6, trop negative, Hgb 11.1, glucose 256. No significant pain medication doses prior to unresponsiveness. Upon arrival patient taken immediately for CT head and CT PE protocol. Prelim read negative for PE or stroke.      PAST MEDICAL & SURGICAL HISTORY:  Sleep apnea: does not use cpap  Seasonal allergies  Osteoporosis  Anemia  Rheumatoid arthritis  Osteoarthritis  UTI (urinary tract infection)  Hip pain  Dysuria  Stress incontinence, female  Renal insufficiency  GERD (gastroesophageal reflux disease)  HTN (hypertension)  Afib  History of nasal surgery  History of cholecystectomy  History of appendectomy  History of surgery: b/l feet with pins  History of surgery: b/l hand reconstruction x2  with plastic hardware b/l  H/O total knee replacement, bilateral    REVIEW OF SYSTEMS:   Pertinent positives/negatives noted in HPI.     HOME MEDICATIONS:  polyethylene glycol 3350 oral powder for reconstitution: Last Dose Taken: 09-Jul-2020 , 17 gram(s) orally once a day  · 	amLODIPine 5 mg oral tablet: Last Dose Taken: 16-Jul-2020 AM, 1 tab(s) orally once a day  · 	celecoxib 200 mg oral capsule: Last Dose Taken: 09-Jul-2020 , 1 cap(s) orally 2 times a day  · 	Metoprolol Succinate ER 25 mg oral tablet, extended release: Last Dose Taken: 12-Jul-2020 , 1 tab(s) orally once a day  · 	pantoprazole 20 mg oral delayed release tablet: Last Dose Taken: 12-Jul-2020 , 1 tab(s) orally once a day  · 	Nasonex 50 mcg/inh nasal spray: Last Dose Taken: 15-Jul-2020 PM, 2 spray(s) nasal once a day, As Needed  · 	Eliquis 2.5 mg oral tablet: Last Dose Taken: 12-Jul-2020 , 1 tab(s) orally 2 times a day  · 	hydroCHLOROthiazide 12.5 mg oral tablet: Last Dose Taken: 16-Jul-2020 AM, 1 tab(s) orally once a day    ALLERGIES:  Allergies    No Known Allergies    Intolerances    Vital Signs Last 24 Hrs  T(C): 37.1 (16 Jul 2020 22:00), Max: 37.1 (16 Jul 2020 22:00)  T(F): 98.7 (16 Jul 2020 22:00), Max: 98.7 (16 Jul 2020 22:00)  HR: 122 (16 Jul 2020 22:50) (67 - 156)  BP: 123/77 (16 Jul 2020 22:50) (84/51 - 166/71)  BP(mean): 94 (16 Jul 2020 22:50) (84 - 96)  RR: 21 (16 Jul 2020 22:50) (11 - 22)  SpO2: 100% (16 Jul 2020 22:50) (82% - 100%)    PHYSICAL EXAM:  GENERAL: Intubated sedated  HEENT: NCAT, MMM  RESP: Intubated  CARD: Normal rate on tele, Normal peripheral perfusion  GI: Soft, NT, ND, No guarding, No rebound tenderness  EXTREM: WWP, No edema, SCDs in place, L hip dressing c/d/i  NEURO: opens eyes intermittently during exam      LABS:                        11.1   20.77 )-----------( 360      ( 16 Jul 2020 22:18 )             34.7     07-16    129<L>  |  93<L>  |  16  ----------------------------<  261<H>  3.3<L>   |  24  |  0.57    Ca    8.2<L>      16 Jul 2020 22:18  Phos  6.1     07-16  Mg     1.9     07-16    TPro  6.6  /  Alb  3.3  /  TBili  0.4  /  DBili  x   /  AST  38  /  ALT  23  /  AlkPhos  88  07-16    PT/INR - ( 16 Jul 2020 09:14 )   PT: 13.2 sec;   INR: 1.11          PTT - ( 16 Jul 2020 09:14 )  PTT:27.9 sec        84F PMHx afib (on eliquis), HTN, JENSEN, OA/RA, b/l hand/knee/feet surgery, recently s/p R OLIVIA (5/17) presented to [facility] for elective L total hip replacement today. Now POD#0 s/p L OLIVIA c/b afib with RVR in PACU, found to be unresponsive on floors now s/p emergent intubation. Transferred to SICU for further management and hemodynamic monitoring.    NEURO: fentanyl, propofol  CV: goal MAP >65, h/o afib on eliquis, RVR 160s with hypotension prior to intubation  -	Trop neg x 1, c/w eliquis, asa  PULM: VC/AC , RR16, PEEP5 FIO2 50%  -	Pre-intubation ABG 7.11/92/356/28/-2.9/100. Lactate 2.6  GI/FEN: NPO, NS@100, PPI  : Roland, voids  ENDO: ISS  HEME: Hgb 11.1 postop  ID: WBC 20.7 postop. Ancef (x2 doses)  PPx: SCDs  LINES: PIVx2, R radial a line  WOUNDS/DRAINS: L hip incision site  PT/OT: Not ordered

## 2020-07-17 NOTE — PROGRESS NOTE ADULT - SUBJECTIVE AND OBJECTIVE BOX
Ortho Note    Pt seen and examined. Comfortable without complaints, pain controlled  Denies CP, SOB, N/V, numbness/tingling     Vital Signs Last 24 Hrs  T(C): 36.8 (07-17-20 @ 12:00), Max: 36.9 (07-17-20 @ 08:35)  T(F): 98.3 (07-17-20 @ 12:00), Max: 98.5 (07-17-20 @ 08:35)  HR: 75 (07-17-20 @ 14:00) (73 - 81)  BP: 105/55 (07-17-20 @ 14:00) (94/49 - 126/60)  BP(mean): 77 (07-17-20 @ 14:00) (69 - 86)  RR: 20 (07-17-20 @ 13:00) (20 - 22)  SpO2: 100% (07-17-20 @ 14:00) (100% - 100%)  AVSS    focused exam:  General: Pt Alert and oriented, NAD  DSG C/D/I  Pulses: +2DP, WWP feet  Sensation: SILT BLE  Motor: 5/5 EHL/FHL/TA/GS                          9.5    11.99 )-----------( 276      ( 17 Jul 2020 05:46 )             29.0   17 Jul 2020 05:46    130    |  97     |  15     ----------------------------<  112    4.4     |  24     |  0.60     Phos  2.4       17 Jul 2020 05:46  Mg     2.5       17 Jul 2020 05:46    TPro  6.6    /  Alb  3.3    /  TBili  0.4    /  DBili  x      /  AST  38     /  ALT  23     /  AlkPhos  88     16 Jul 2020 22:18      A/P: 84yFemale POD#1 s/p left total hip replacement complicated by acetabular fx in OR  - VSS, medical management as per SICU  - Pain Control  - DVT ppx: eliquis bid (home med)  - PT, WBS: TTWB LLE  - acetabular fx in OR, follow-up L hip/pelvis x-rays PO#2  - aggressive I/S; diet as tolerated  - bowel regimen  - dispo: pending PT eval, likely TEJAL    Ortho Pager [phone]

## 2020-07-17 NOTE — DISCHARGE NOTE PROVIDER - NSDCCPCAREPLAN_GEN_ALL_CORE_FT
PRINCIPAL DISCHARGE DIAGNOSIS  Diagnosis: Osteoarthritis  Assessment and Plan of Treatment: improvement s/p left THR

## 2020-07-17 NOTE — PHYSICAL THERAPY INITIAL EVALUATION ADULT - TRANSFER SAFETY CONCERNS NOTED: SIT/STAND, REHAB EVAL
inability to maintain weight-bearing restrictions w/o assist/decreased proprioception/losing balance/decreased weight-shifting ability

## 2020-07-17 NOTE — DISCHARGE NOTE PROVIDER - HOSPITAL COURSE
Admitted 7/16/20 for left total hip replacement    Intra-op L acetabular fracture- Touch- toe weight bearing left leg    Hypertensive crisis; rapid afib; required intubation and SICU consult on post-op day 0    Extubated Post-op day 1 AM - 7/17/20    Minnie-op Antibiotics    Pain control    DVT prophylaxis    OOB/Physical Therapy

## 2020-07-17 NOTE — PROGRESS NOTE ADULT - SUBJECTIVE AND OBJECTIVE BOX
Ortho Note    intubated at bedside, responsive to commands, denies pain.   Denies CP, SOB, N/V, numbness/tingling     Vital Signs Last 24 Hrs  T(C): 36.7 (07-17-20 @ 05:33), Max: 36.7 (07-17-20 @ 05:33)  T(F): 98 (07-17-20 @ 05:33), Max: 98 (07-17-20 @ 05:33)  HR: 78 (07-17-20 @ 06:00) (64 - 78)  BP: 151/70 (07-17-20 @ 06:00) (98/57 - 151/70)  BP(mean): 100 (07-17-20 @ 06:00) (73 - 100)  RR: 15 (07-17-20 @ 06:00) (12 - 15)  SpO2: 100% (07-17-20 @ 06:00) (100% - 100%)      General: Pt Alert and oriented, NAD; intubated  DSG C/D/I  Pulses: palpable DP pulse  Sensation: SILT over distal limb and symmetric to contralateral side  Motor: 5/5 EHL/FHL/TA/GS                          9.5    11.99 )-----------( 276      ( 17 Jul 2020 05:46 )             29.0   17 Jul 2020 05:46    130    |  97     |  15     ----------------------------<  112    4.4     |  24     |  0.60     Ca    8.0        17 Jul 2020 05:46  Phos  2.4       17 Jul 2020 05:46  Mg     2.5       17 Jul 2020 05:46    TPro  6.6    /  Alb  3.3    /  TBili  0.4    /  DBili  x      /  AST  38     /  ALT  23     /  AlkPhos  88     16 Jul 2020 22:18      A/P: 84yFemale POD#1 s/p L OLIVIA c/b unresponsiveness and desat requiring intubation, this morning alert and oriented, ICU to determine if pt can be extubated this AM. CT head and CTA PE negative  - care as per ICU  - Pain Control  - DVT ppx: asa and eliquis  - PT, WBS: ttwb    Ortho Pager [phone]

## 2020-07-17 NOTE — PROCEDURAL SAFETY CHECKLIST WITH OR WITHOUT SEDATION - NSPREALLERGYSED_GEN_ALL_CORE
INR good. Patient reports pain/soreness in legs recently. She attributes it to doing more activity with moving office stuff. No signs or symptoms of bleeding. She has started taking a vitamin occasionally. She does not recall the name of it. She was cautioned to check if it has vit K or other herbal supplements that may not be good with being on a blood thinner. No other changes. INR has been stable. Continue maintenance dose and link INR with other labs planned for 7/15  
done

## 2020-07-18 LAB
ANION GAP SERPL CALC-SCNC: 6 MMOL/L — SIGNIFICANT CHANGE UP (ref 5–17)
ANION GAP SERPL CALC-SCNC: 7 MMOL/L — SIGNIFICANT CHANGE UP (ref 5–17)
BUN SERPL-MCNC: 21 MG/DL — SIGNIFICANT CHANGE UP (ref 7–23)
BUN SERPL-MCNC: 24 MG/DL — HIGH (ref 7–23)
CALCIUM SERPL-MCNC: 8 MG/DL — LOW (ref 8.4–10.5)
CALCIUM SERPL-MCNC: 8.1 MG/DL — LOW (ref 8.4–10.5)
CHLORIDE SERPL-SCNC: 97 MMOL/L — SIGNIFICANT CHANGE UP (ref 96–108)
CHLORIDE SERPL-SCNC: 98 MMOL/L — SIGNIFICANT CHANGE UP (ref 96–108)
CO2 SERPL-SCNC: 26 MMOL/L — SIGNIFICANT CHANGE UP (ref 22–31)
CO2 SERPL-SCNC: 27 MMOL/L — SIGNIFICANT CHANGE UP (ref 22–31)
CREAT SERPL-MCNC: 0.78 MG/DL — SIGNIFICANT CHANGE UP (ref 0.5–1.3)
CREAT SERPL-MCNC: 0.85 MG/DL — SIGNIFICANT CHANGE UP (ref 0.5–1.3)
GLUCOSE BLDC GLUCOMTR-MCNC: 106 MG/DL — HIGH (ref 70–99)
GLUCOSE BLDC GLUCOMTR-MCNC: 122 MG/DL — HIGH (ref 70–99)
GLUCOSE BLDC GLUCOMTR-MCNC: 150 MG/DL — HIGH (ref 70–99)
GLUCOSE SERPL-MCNC: 104 MG/DL — HIGH (ref 70–99)
GLUCOSE SERPL-MCNC: 129 MG/DL — HIGH (ref 70–99)
HCT VFR BLD CALC: 26.9 % — LOW (ref 34.5–45)
HGB BLD-MCNC: 8.5 G/DL — LOW (ref 11.5–15.5)
MAGNESIUM SERPL-MCNC: 2.8 MG/DL — HIGH (ref 1.6–2.6)
MCHC RBC-ENTMCNC: 29.8 PG — SIGNIFICANT CHANGE UP (ref 27–34)
MCHC RBC-ENTMCNC: 31.6 GM/DL — LOW (ref 32–36)
MCV RBC AUTO: 94.4 FL — SIGNIFICANT CHANGE UP (ref 80–100)
NRBC # BLD: 0 /100 WBCS — SIGNIFICANT CHANGE UP (ref 0–0)
OSMOLALITY UR: 213 MOSMOL/KG — SIGNIFICANT CHANGE UP (ref 100–650)
PHOSPHATE SERPL-MCNC: 3.5 MG/DL — SIGNIFICANT CHANGE UP (ref 2.5–4.5)
PLATELET # BLD AUTO: 202 K/UL — SIGNIFICANT CHANGE UP (ref 150–400)
POTASSIUM SERPL-MCNC: 4.2 MMOL/L — SIGNIFICANT CHANGE UP (ref 3.5–5.3)
POTASSIUM SERPL-MCNC: 4.4 MMOL/L — SIGNIFICANT CHANGE UP (ref 3.5–5.3)
POTASSIUM SERPL-SCNC: 4.2 MMOL/L — SIGNIFICANT CHANGE UP (ref 3.5–5.3)
POTASSIUM SERPL-SCNC: 4.4 MMOL/L — SIGNIFICANT CHANGE UP (ref 3.5–5.3)
RBC # BLD: 2.85 M/UL — LOW (ref 3.8–5.2)
RBC # FLD: 12.8 % — SIGNIFICANT CHANGE UP (ref 10.3–14.5)
SODIUM SERPL-SCNC: 130 MMOL/L — LOW (ref 135–145)
SODIUM SERPL-SCNC: 131 MMOL/L — LOW (ref 135–145)
SODIUM UR-SCNC: 20 MMOL/L — SIGNIFICANT CHANGE UP
WBC # BLD: 7.08 K/UL — SIGNIFICANT CHANGE UP (ref 3.8–10.5)
WBC # FLD AUTO: 7.08 K/UL — SIGNIFICANT CHANGE UP (ref 3.8–10.5)

## 2020-07-18 PROCEDURE — 72170 X-RAY EXAM OF PELVIS: CPT | Mod: 26

## 2020-07-18 PROCEDURE — 99233 SBSQ HOSP IP/OBS HIGH 50: CPT | Mod: GC

## 2020-07-18 RX ADMIN — PANTOPRAZOLE SODIUM 40 MILLIGRAM(S): 20 TABLET, DELAYED RELEASE ORAL at 06:31

## 2020-07-18 RX ADMIN — CHLORHEXIDINE GLUCONATE 1 APPLICATION(S): 213 SOLUTION TOPICAL at 06:32

## 2020-07-18 RX ADMIN — CELECOXIB 200 MILLIGRAM(S): 200 CAPSULE ORAL at 06:31

## 2020-07-18 RX ADMIN — POLYETHYLENE GLYCOL 3350 17 GRAM(S): 17 POWDER, FOR SOLUTION ORAL at 11:22

## 2020-07-18 RX ADMIN — APIXABAN 2.5 MILLIGRAM(S): 2.5 TABLET, FILM COATED ORAL at 17:42

## 2020-07-18 RX ADMIN — APIXABAN 2.5 MILLIGRAM(S): 2.5 TABLET, FILM COATED ORAL at 06:32

## 2020-07-18 RX ADMIN — CELECOXIB 200 MILLIGRAM(S): 200 CAPSULE ORAL at 07:23

## 2020-07-18 RX ADMIN — Medication 81 MILLIGRAM(S): at 11:22

## 2020-07-18 RX ADMIN — Medication 3 MILLIGRAM(S): at 21:30

## 2020-07-18 RX ADMIN — CELECOXIB 200 MILLIGRAM(S): 200 CAPSULE ORAL at 17:41

## 2020-07-18 RX ADMIN — CELECOXIB 200 MILLIGRAM(S): 200 CAPSULE ORAL at 18:24

## 2020-07-18 NOTE — PROGRESS NOTE ADULT - ASSESSMENT
84F PMHx afib (on eliquis), HTN, JENSEN, OA/RA, b/l hand/knee/feet surgery, recently s/p R OLIVIA (5/17) presented to [facility] for elective L total hip replacement today. Now POD#0 s/p L OLIVIA c/b afib with RVR in PACU, found to be unresponsive on floors now s/p emergent intubation. Transferred to SICU for further management and hemodynamic monitoring.    NEURO: Tylenol PRN   CV: Hx of Afib Cont eliquis, asa  PULM: Satting well on NC Hx of JENSEN: Bipap 10/5 while sleeping  GI/FEN:Severe protein calorie malnutrition Regular  PPI  : Roland, voids, work up to r/o SIADH in the setting of hyponatremia  ENDO: ISS  ID:  None. DC: Ancef (7/16)  PPx: SCDs Eliquis  LINES: PIVx2, R radial a line( 716-7/17)   WOUNDS/DRAINS: L hip incision site  PT/OT: Ordered 7/16

## 2020-07-18 NOTE — PROGRESS NOTE ADULT - SUBJECTIVE AND OBJECTIVE BOX
Ortho Note    extubated yesterday.  Patient doing well.  No issues overnight, on oxygen.  Pt was supposed to transferred to stepdown unit overnight but there was no bed.  Denies CP, SOB, N/V, numbness/tingling     Vital Signs Last 24 Hrs  T(C): 36.6 (18 Jul 2020 06:03), Max: 37.4 (17 Jul 2020 21:51)  T(F): 97.9 (18 Jul 2020 06:03), Max: 99.4 (17 Jul 2020 21:51)  HR: 83 (18 Jul 2020 08:00) (73 - 90)  BP: 129/83 (18 Jul 2020 08:00) (92/49 - 138/63)  BP(mean): 102 (18 Jul 2020 08:00) (67 - 102)  RR: 24 (18 Jul 2020 08:00) (14 - 35)  SpO2: 100% (18 Jul 2020 08:00) (100% - 100%)      General: Pt Alert and oriented, NAD; intubated  DSG C/D/I  Pulses: palpable DP pulse  Sensation: SILT over distal limb and symmetric to contralateral side  Motor: intact EHL/FHL/TA/GS                          8.5    7.08  )-----------( 202      ( 18 Jul 2020 06:08 )             26.9                   A/P: 84yFemale s/p L OLIVIA c/b unresponsiveness and desat requiring intubation.  Pt extubated successfully yesterday.    - Pain Control  - DVT ppx: asa and eliquis  - PT, WBS: ttwb  -Dispo: most likely TEJAL Galloway MD  Senior Orthopaedic Resident  Orthopaedic Surgery

## 2020-07-19 LAB
ANION GAP SERPL CALC-SCNC: 7 MMOL/L — SIGNIFICANT CHANGE UP (ref 5–17)
BUN SERPL-MCNC: 16 MG/DL — SIGNIFICANT CHANGE UP (ref 7–23)
CALCIUM SERPL-MCNC: 8.3 MG/DL — LOW (ref 8.4–10.5)
CHLORIDE SERPL-SCNC: 102 MMOL/L — SIGNIFICANT CHANGE UP (ref 96–108)
CO2 SERPL-SCNC: 27 MMOL/L — SIGNIFICANT CHANGE UP (ref 22–31)
CREAT SERPL-MCNC: 0.58 MG/DL — SIGNIFICANT CHANGE UP (ref 0.5–1.3)
GLUCOSE SERPL-MCNC: 96 MG/DL — SIGNIFICANT CHANGE UP (ref 70–99)
HCT VFR BLD CALC: 26.6 % — LOW (ref 34.5–45)
HGB BLD-MCNC: 8.6 G/DL — LOW (ref 11.5–15.5)
MAGNESIUM SERPL-MCNC: 2.3 MG/DL — SIGNIFICANT CHANGE UP (ref 1.6–2.6)
MCHC RBC-ENTMCNC: 30.9 PG — SIGNIFICANT CHANGE UP (ref 27–34)
MCHC RBC-ENTMCNC: 32.3 GM/DL — SIGNIFICANT CHANGE UP (ref 32–36)
MCV RBC AUTO: 95.7 FL — SIGNIFICANT CHANGE UP (ref 80–100)
NRBC # BLD: 0 /100 WBCS — SIGNIFICANT CHANGE UP (ref 0–0)
OSMOLALITY SERPL: 283 MOSMOL/KG — SIGNIFICANT CHANGE UP (ref 280–301)
PHOSPHATE SERPL-MCNC: 2.5 MG/DL — SIGNIFICANT CHANGE UP (ref 2.5–4.5)
PLATELET # BLD AUTO: 221 K/UL — SIGNIFICANT CHANGE UP (ref 150–400)
POTASSIUM SERPL-MCNC: 4.9 MMOL/L — SIGNIFICANT CHANGE UP (ref 3.5–5.3)
POTASSIUM SERPL-SCNC: 4.9 MMOL/L — SIGNIFICANT CHANGE UP (ref 3.5–5.3)
RBC # BLD: 2.78 M/UL — LOW (ref 3.8–5.2)
RBC # FLD: 12.7 % — SIGNIFICANT CHANGE UP (ref 10.3–14.5)
SODIUM SERPL-SCNC: 136 MMOL/L — SIGNIFICANT CHANGE UP (ref 135–145)
WBC # BLD: 5.01 K/UL — SIGNIFICANT CHANGE UP (ref 3.8–10.5)
WBC # FLD AUTO: 5.01 K/UL — SIGNIFICANT CHANGE UP (ref 3.8–10.5)

## 2020-07-19 RX ORDER — HYDROCHLOROTHIAZIDE 25 MG
12.5 TABLET ORAL DAILY
Refills: 0 | Status: DISCONTINUED | OUTPATIENT
Start: 2020-07-19 | End: 2020-07-20

## 2020-07-19 RX ORDER — AMLODIPINE BESYLATE 2.5 MG/1
5 TABLET ORAL DAILY
Refills: 0 | Status: DISCONTINUED | OUTPATIENT
Start: 2020-07-19 | End: 2020-07-20

## 2020-07-19 RX ADMIN — APIXABAN 2.5 MILLIGRAM(S): 2.5 TABLET, FILM COATED ORAL at 06:07

## 2020-07-19 RX ADMIN — Medication 81 MILLIGRAM(S): at 12:16

## 2020-07-19 RX ADMIN — Medication 3 MILLIGRAM(S): at 22:36

## 2020-07-19 RX ADMIN — Medication 600 MILLIGRAM(S): at 00:59

## 2020-07-19 RX ADMIN — PANTOPRAZOLE SODIUM 40 MILLIGRAM(S): 20 TABLET, DELAYED RELEASE ORAL at 06:07

## 2020-07-19 RX ADMIN — CHLORHEXIDINE GLUCONATE 1 APPLICATION(S): 213 SOLUTION TOPICAL at 06:04

## 2020-07-19 RX ADMIN — CELECOXIB 200 MILLIGRAM(S): 200 CAPSULE ORAL at 06:54

## 2020-07-19 RX ADMIN — Medication 12.5 MILLIGRAM(S): at 18:07

## 2020-07-19 RX ADMIN — CELECOXIB 200 MILLIGRAM(S): 200 CAPSULE ORAL at 18:06

## 2020-07-19 RX ADMIN — Medication 600 MILLIGRAM(S): at 13:51

## 2020-07-19 RX ADMIN — Medication 650 MILLIGRAM(S): at 22:35

## 2020-07-19 RX ADMIN — Medication 650 MILLIGRAM(S): at 23:21

## 2020-07-19 RX ADMIN — POLYETHYLENE GLYCOL 3350 17 GRAM(S): 17 POWDER, FOR SOLUTION ORAL at 12:16

## 2020-07-19 RX ADMIN — CELECOXIB 200 MILLIGRAM(S): 200 CAPSULE ORAL at 06:07

## 2020-07-19 RX ADMIN — APIXABAN 2.5 MILLIGRAM(S): 2.5 TABLET, FILM COATED ORAL at 18:07

## 2020-07-19 NOTE — PROGRESS NOTE ADULT - SUBJECTIVE AND OBJECTIVE BOX
Ortho Note     Patient doing well.  No issues overnight, on oxygen.    Denies CP, SOB, N/V, numbness/tingling     Vital Signs Last 24 Hrs  T(C): 36.4 (19 Jul 2020 06:09), Max: 36.8 (18 Jul 2020 19:09)  T(F): 97.6 (19 Jul 2020 06:09), Max: 98.2 (18 Jul 2020 19:09)  HR: 96 (19 Jul 2020 09:31) (83 - 98)  BP: 162/75 (19 Jul 2020 06:09) (129/60 - 166/75)  BP(mean): 111 (18 Jul 2020 19:00) (86 - 111)  RR: 17 (19 Jul 2020 09:31) (16 - 34)  SpO2: 100% (19 Jul 2020 09:31) (100% - 100%)      General: Pt Alert and oriented, NAD; intubated  DSG C/D/I  Pulses: palpable DP pulse  Sensation: SILT over distal limb and symmetric to contralateral side  Motor: intact EHL/FHL/TA/GS                               8.6    5.01  )-----------( 221      ( 19 Jul 2020 07:30 )             26.6         A/P: 84yFemale s/p L OLIVIA c/b unresponsiveness and desat requiring intubation.  pt transferred to stepdown yesterday  - Pain Control  - DVT ppx: asa and eliquis  - PT, WBS: ttwb  -Dispo: most likely TEJAL Galloway MD  Senior Orthopaedic Resident  Orthopaedic Surgery

## 2020-07-20 LAB
ANION GAP SERPL CALC-SCNC: 8 MMOL/L — SIGNIFICANT CHANGE UP (ref 5–17)
BUN SERPL-MCNC: 12 MG/DL — SIGNIFICANT CHANGE UP (ref 7–23)
CALCIUM SERPL-MCNC: 8.1 MG/DL — LOW (ref 8.4–10.5)
CHLORIDE SERPL-SCNC: 101 MMOL/L — SIGNIFICANT CHANGE UP (ref 96–108)
CO2 SERPL-SCNC: 28 MMOL/L — SIGNIFICANT CHANGE UP (ref 22–31)
CREAT SERPL-MCNC: 0.53 MG/DL — SIGNIFICANT CHANGE UP (ref 0.5–1.3)
FERRITIN SERPL-MCNC: 148 NG/ML — SIGNIFICANT CHANGE UP (ref 15–150)
GLUCOSE SERPL-MCNC: 107 MG/DL — HIGH (ref 70–99)
HCT VFR BLD CALC: 25.4 % — LOW (ref 34.5–45)
HGB BLD-MCNC: 8.2 G/DL — LOW (ref 11.5–15.5)
IRON SATN MFR SERPL: 12 % — LOW (ref 14–50)
IRON SATN MFR SERPL: 29 UG/DL — LOW (ref 30–160)
MAGNESIUM SERPL-MCNC: 2 MG/DL — SIGNIFICANT CHANGE UP (ref 1.6–2.6)
MCHC RBC-ENTMCNC: 30.6 PG — SIGNIFICANT CHANGE UP (ref 27–34)
MCHC RBC-ENTMCNC: 32.3 GM/DL — SIGNIFICANT CHANGE UP (ref 32–36)
MCV RBC AUTO: 94.8 FL — SIGNIFICANT CHANGE UP (ref 80–100)
NRBC # BLD: 0 /100 WBCS — SIGNIFICANT CHANGE UP (ref 0–0)
PHOSPHATE SERPL-MCNC: 2.9 MG/DL — SIGNIFICANT CHANGE UP (ref 2.5–4.5)
PLATELET # BLD AUTO: 225 K/UL — SIGNIFICANT CHANGE UP (ref 150–400)
POTASSIUM SERPL-MCNC: 3.7 MMOL/L — SIGNIFICANT CHANGE UP (ref 3.5–5.3)
POTASSIUM SERPL-SCNC: 3.7 MMOL/L — SIGNIFICANT CHANGE UP (ref 3.5–5.3)
RBC # BLD: 2.68 M/UL — LOW (ref 3.8–5.2)
RBC # FLD: 12.7 % — SIGNIFICANT CHANGE UP (ref 10.3–14.5)
SODIUM SERPL-SCNC: 137 MMOL/L — SIGNIFICANT CHANGE UP (ref 135–145)
T4 FREE SERPL-MCNC: 1.05 NG/DL — SIGNIFICANT CHANGE UP (ref 0.7–1.48)
TIBC SERPL-MCNC: 236 UG/DL — SIGNIFICANT CHANGE UP (ref 220–430)
TSH SERPL-MCNC: 0.51 UIU/ML — SIGNIFICANT CHANGE UP (ref 0.35–4.94)
UIBC SERPL-MCNC: 207 UG/DL — SIGNIFICANT CHANGE UP (ref 110–370)
WBC # BLD: 5.02 K/UL — SIGNIFICANT CHANGE UP (ref 3.8–10.5)
WBC # FLD AUTO: 5.02 K/UL — SIGNIFICANT CHANGE UP (ref 3.8–10.5)

## 2020-07-20 PROCEDURE — 99233 SBSQ HOSP IP/OBS HIGH 50: CPT

## 2020-07-20 PROCEDURE — 99232 SBSQ HOSP IP/OBS MODERATE 35: CPT | Mod: GC

## 2020-07-20 RX ORDER — FLUTICASONE PROPIONATE 50 MCG
2 SPRAY, SUSPENSION NASAL DAILY
Refills: 0 | Status: DISCONTINUED | OUTPATIENT
Start: 2020-07-20 | End: 2020-07-22

## 2020-07-20 RX ORDER — METOPROLOL TARTRATE 50 MG
25 TABLET ORAL
Refills: 0 | Status: DISCONTINUED | OUTPATIENT
Start: 2020-07-20 | End: 2020-07-22

## 2020-07-20 RX ORDER — FERROUS SULFATE 325(65) MG
325 TABLET ORAL DAILY
Refills: 0 | Status: DISCONTINUED | OUTPATIENT
Start: 2020-07-20 | End: 2020-07-22

## 2020-07-20 RX ORDER — METOPROLOL TARTRATE 50 MG
5 TABLET ORAL ONCE
Refills: 0 | Status: COMPLETED | OUTPATIENT
Start: 2020-07-20 | End: 2020-07-20

## 2020-07-20 RX ORDER — METOPROLOL TARTRATE 50 MG
25 TABLET ORAL DAILY
Refills: 0 | Status: DISCONTINUED | OUTPATIENT
Start: 2020-07-20 | End: 2020-07-20

## 2020-07-20 RX ADMIN — Medication 600 MILLIGRAM(S): at 21:47

## 2020-07-20 RX ADMIN — Medication 2 SPRAY(S): at 17:16

## 2020-07-20 RX ADMIN — AMLODIPINE BESYLATE 5 MILLIGRAM(S): 2.5 TABLET ORAL at 05:49

## 2020-07-20 RX ADMIN — Medication 650 MILLIGRAM(S): at 04:37

## 2020-07-20 RX ADMIN — CELECOXIB 200 MILLIGRAM(S): 200 CAPSULE ORAL at 17:16

## 2020-07-20 RX ADMIN — Medication 81 MILLIGRAM(S): at 13:32

## 2020-07-20 RX ADMIN — Medication 25 MILLIGRAM(S): at 18:29

## 2020-07-20 RX ADMIN — APIXABAN 2.5 MILLIGRAM(S): 2.5 TABLET, FILM COATED ORAL at 05:49

## 2020-07-20 RX ADMIN — Medication 12.5 MILLIGRAM(S): at 05:49

## 2020-07-20 RX ADMIN — PANTOPRAZOLE SODIUM 40 MILLIGRAM(S): 20 TABLET, DELAYED RELEASE ORAL at 05:49

## 2020-07-20 RX ADMIN — CELECOXIB 200 MILLIGRAM(S): 200 CAPSULE ORAL at 17:15

## 2020-07-20 RX ADMIN — Medication 650 MILLIGRAM(S): at 05:16

## 2020-07-20 RX ADMIN — Medication 3 MILLIGRAM(S): at 21:20

## 2020-07-20 RX ADMIN — CHLORHEXIDINE GLUCONATE 1 APPLICATION(S): 213 SOLUTION TOPICAL at 05:56

## 2020-07-20 RX ADMIN — APIXABAN 2.5 MILLIGRAM(S): 2.5 TABLET, FILM COATED ORAL at 17:16

## 2020-07-20 RX ADMIN — Medication 5 MILLIGRAM(S): at 13:36

## 2020-07-20 RX ADMIN — CELECOXIB 200 MILLIGRAM(S): 200 CAPSULE ORAL at 05:56

## 2020-07-20 RX ADMIN — CELECOXIB 200 MILLIGRAM(S): 200 CAPSULE ORAL at 05:49

## 2020-07-20 RX ADMIN — Medication 600 MILLIGRAM(S): at 01:16

## 2020-07-20 RX ADMIN — Medication 325 MILLIGRAM(S): at 17:15

## 2020-07-20 RX ADMIN — Medication 650 MILLIGRAM(S): at 11:50

## 2020-07-20 RX ADMIN — Medication 25 MILLIGRAM(S): at 05:49

## 2020-07-20 RX ADMIN — Medication 650 MILLIGRAM(S): at 12:20

## 2020-07-20 NOTE — PROGRESS NOTE ADULT - SUBJECTIVE AND OBJECTIVE BOX
Called by patient's nurse, Deidre, at 11:12am. Patient was moved to a chair to eat her breakfast around 11am and then her heart rate jumped to 130s-150s and she went from NSR to rapid a-fib. I came to examine the patient. She denies any chest pain, shortness of breath, dizziness. She states she is "aggravated and worried" due to everyone acting concerned about her HR. Vitals were taken at that time with manual radial pulse of 110 (confirmed by both SHANNAN Jiang and SHANNAN Mike). /81. Tele monitor shows rapid a-fib with HR between 130s and 170s. Patient was placed back in bed and stat EKG was performed - afib with rvr 135 BPM. Patient continues to endorse no symptoms. Will re-check vitals in 30 mins.    Patient has a h/o afib. Her pre-op EKG on 7/2/2020 demonstrates rate-controlled a-fib at 72 BPM. Patient was also in rapid a-fib in PACU on 7/16/2020. She admitted at that time that she had not taken her Metoprolol for 4 days pre-op. She was given her eliquis and metoprolol this morning as prescribed. Orthopaedic Surgery Progress Note    Post-operative day #4 s/p L OLIVIA    Subjective:     Patient seen and examined at 9:00am. Patient comfortable without complaints, pain controlled.  Denies chest pain, shortness of breath, nausea/vomiting, numbness/tingling.    Objective:    Vital Signs Last 24 Hrs  T(C): 36.7 (07-20-20 @ 08:30), Max: 36.7 (07-20-20 @ 08:30)  T(F): 98.1 (07-20-20 @ 08:30), Max: 98.1 (07-20-20 @ 08:30)  HR: 93 (07-20-20 @ 10:22) (90 - 94)  BP: 147/69 (07-20-20 @ 08:30) (147/69 - 147/69)  RR: 8 (07-20-20 @ 10:22) (8 - 20)  SpO2: 100% (07-20-20 @ 10:22) (99% - 100%)  AVSS    PE:  General: Patient alert and oriented, NAD  Dressing: Clean/dry/intact  Pulses: 2+ DP BLE   Sensation: SILT BLE   Motor: EHL/FHL/TA/GS 5/5 BLE                           8.2    5.02  )-----------( 225      ( 20 Jul 2020 06:19 )             25.4   20 Jul 2020 06:19    137    |  101    |  12     ----------------------------<  107    3.7     |  28     |  0.53     Ca    8.1        20 Jul 2020 06:19  Phos  2.9       20 Jul 2020 06:19  Mg     2.0       20 Jul 2020 06:19        A/P: 84yFemale POD#4 s/p L OLIVIA  1. Pain control as needed  2. DVT prophylaxis: ASA and Eliquis  3. PT, weight-bearing status: TTWB   4. Dispo: TEJAL    Ortho Pager [phone]    ADDENDUM:  Called by patient's nurse, Deidre, at 11:12am. Patient was moved to a chair to eat her breakfast around 11am and then her heart rate jumped to 130s-150s and she went from NSR to rapid a-fib. I came to examine the patient. She denies any chest pain, shortness of breath, dizziness. She states she is "aggravated and worried" due to everyone acting concerned about her HR. Vitals were taken at that time with manual radial pulse of 110 (confirmed by both SHANNAN Jiang and SHANNAN Mike). /81. Tele monitor shows rapid a-fib with HR between 130s and 170s. Patient was placed back in bed and stat EKG was performed - afib with rvr 135 BPM. Patient continues to endorse no symptoms. Will re-check vitals in 30 mins.    Patient has a h/o afib. Her pre-op EKG on 7/2/2020 demonstrates rate-controlled a-fib at 72 BPM. Patient was also in rapid a-fib in PACU on 7/16/2020. She admitted at that time that she had not taken her Metoprolol for 4 days pre-op. She was given her eliquis and metoprolol this morning as prescribed.

## 2020-07-20 NOTE — CONSULT NOTE ADULT - SUBJECTIVE AND OBJECTIVE BOX
Cardiology Consult    HPI: 84F PMH HTN, AFib on eliquis presented for elective OLIVIA in setting of OA. Patient noted to become hypertensive and Afib RVR to 120s patient as given IV labetolol and metoprolol, became hypotensive and unresponsive- noted to be hypercarbic and intubated and admitted to SICU. Patient now extubated and has noted to be in AFib RVR. Patient states that she has not been taking beta blocker over last week prior to admission. Cardiology consulted for further rate control in setting of RVR to 150s. Patient taking toprol 25 xl qD. Patient denies chest pain, no palpitations, denies shortness of breath. Reports increased urination, of note is net negative 3L over last 24 hours.     Telemetry: Afib, rapid rate to 160    OBJECTIVE  Vitals:  T(C): 36.6 (07-20-20 @ 16:53), Max: 36.7 (07-20-20 @ 08:30)  HR: 107 (07-20-20 @ 15:44) (90 - 107)  BP: 136/64 (07-20-20 @ 16:53) (107/62 - 165/82)  RR: 18 (07-20-20 @ 15:26) (8 - 20)  SpO2: 100% (07-20-20 @ 15:26) (99% - 100%)  Wt(kg): --    I/O:  I&O's Summary    19 Jul 2020 07:01  -  20 Jul 2020 07:00  --------------------------------------------------------  IN: 0 mL / OUT: 2900 mL / NET: -2900 mL    20 Jul 2020 07:01  -  20 Jul 2020 17:11  --------------------------------------------------------  IN: 500 mL / OUT: 1500 mL / NET: -1000 mL        PHYSICAL EXAM:  Appearance: NAD. Speaking in full sentences.   HEENT:Conjunctiva clear b/l. Moist oral mucosa. NO JVD  Cardiovascular: Irregularly irregular, rapid rate, with no murmurs.  Respiratory: Lungs CTAB.   Gastrointestinal:  Soft, nontender. Non-distended. Non-rigid.	  Extremities: No edema b/l. No erythema b/l. LE WWP b/l, DP intact  Neurologic:  Alert and awake. Moving all extremities. Following commands.   	  LABS:                        8.2    5.02  )-----------( 225      ( 20 Jul 2020 06:19 )             25.4     07-20    137  |  101  |  12  ----------------------------<  107<H>  3.7   |  28  |  0.53    Ca    8.1<L>      20 Jul 2020 06:19  Phos  2.9     07-20  Mg     2.0     07-20            RADIOLOGY & ADDITIONAL TESTS:  Reviewed .    MEDICATIONS  (STANDING):  amLODIPine   Tablet 5 milliGRAM(s) Oral daily  apixaban 2.5 milliGRAM(s) Oral every 12 hours  aspirin  chewable 81 milliGRAM(s) Oral daily  celecoxib 200 milliGRAM(s) Oral two times a day  chlorhexidine 2% Cloths 1 Application(s) Topical <User Schedule>  ferrous    sulfate 325 milliGRAM(s) Oral daily  fluticasone propionate 50 MICROgram(s)/spray Nasal Spray 2 Spray(s) Both Nostrils daily  hydrochlorothiazide 12.5 milliGRAM(s) Oral daily  melatonin 3 milliGRAM(s) Oral at bedtime  metoprolol succinate ER 25 milliGRAM(s) Oral daily  pantoprazole  Injectable 40 milliGRAM(s) IV Push every 24 hours  polyethylene glycol 3350 17 Gram(s) Oral daily    MEDICATIONS  (PRN):  acetaminophen   Tablet .. 650 milliGRAM(s) Oral every 6 hours PRN Moderate Pain (4 - 6), Severe Pain (7 - 10)  guaiFENesin  milliGRAM(s) Oral every 12 hours PRN Cough  senna 2 Tablet(s) Oral at bedtime PRN Constipation

## 2020-07-20 NOTE — CONSULT NOTE ADULT - ASSESSMENT
84F w h/o AF on eliquis, HTN, RA, R OLIVIA here w progressive L hip pain and difficulty ambulating s/p elective L OLIVIA w Dr. Damon on 7/16 w course c/b AF w RVR and RRT for unresponsiveness on 7/16 to SICU requiring intubation (found to have metabolic acidosis w/ hypercarbia PaCO2 90s), now extubated and transferred to [facility].    #Post-op state. Pain appears controlled. On bowel regimen and IS. PPx: On eliquis and ASA. DISPO: TEJAL  #AF - borderline rate controlled. On home toprol. c/w AC (reduced dose 2.5mg BID as meets criteria for wt and age)  #HTN - at target. On home regimen w amlodipine and HCTZ  #Normocytic anemia - hgb 8.2 from 8.6. Appears asymptomatic. Post-op has been in 8-9s. Pre-op pt was 13 however 2019 post-op hgb also in 8-9 range. May consider tranfusion target >8 if remains tachycardic.  #Hyponatremia - since resolved  #RA - chronic. takes celebrex    Plan  --Overall pain controlled and appears euvolemic. Thus may benefit from increasing toprol from 50 to 100mg  --Would consult cardiology in setting of borderline rate control and recent RVR  --Iron panel c/w DENISSE. Start FeSO4 daily PO  --Maintain K/Mg > 4 and 2, respectively  --OT consult - has mutliple deformities in hands and toes and states she has special ?orthotics vs sneakers when ambulating    Dispo: Telemetry. Pt for TEJAL pending optimization of HR 84F w h/o AF on eliquis, HTN, RA, R OLIVIA here w progressive L hip pain and difficulty ambulating s/p elective L OLIVIA w Dr. Damon on 7/16 w course c/b AF w RVR and RRT for unresponsiveness on 7/16 to SICU requiring intubation (found to have metabolic acidosis w/ hypercarbia PaCO2 90s), now extubated and transferred to [facility].    #Post-op state. Pain appears controlled. On bowel regimen and IS. PPx: On eliquis and ASA. DISPO: TEJAL  #AF - borderline rate controlled. On home toprol. c/w AC (reduced dose 2.5mg BID as meets criteria for wt and age)  #HTN - at target. On home regimen w amlodipine and HCTZ  #Normocytic anemia - hgb 8.2 from 8.6. Appears asymptomatic. Post-op has been in 8-9s. Pre-op pt was 11 however 2019 post-op hgb also in 8-9 range. May consider tranfusion target >8 if remains tachycardic.  #Hyponatremia - since resolved  #RA - chronic. takes celebrex    Plan  --Overall pain controlled and appears euvolemic. Thus may benefit from increasing toprol from 25 to 50mg  --Would consult cardiology in setting of borderline rate control and recent RVR  --Iron panel c/w DENISSE. Start FeSO4 daily PO  --Maintain K/Mg > 4 and 2, respectively  --OT consult - has mutliple deformities in hands and toes and states she has special ?orthotics vs sneakers when ambulating    Dispo: Telemetry. Pt for TEJAL pending optimization of HR

## 2020-07-20 NOTE — CONSULT NOTE ADULT - ATTENDING COMMENTS
84F w/ h/o Atrial fibrillation, HTN and JENSEN admitted for hip repair, consulted for post-op afib with RVR  - BB restarted, increase to Toprol 25 po q12h  - DC HCTZ given pt likely over diuresed ( labs and I/Os reviewed)  - encourage PO fluids  - check TSH, Echo  - cont NOAC

## 2020-07-20 NOTE — CONSULT NOTE ADULT - SUBJECTIVE AND OBJECTIVE BOX
84F w h/o AF on eliquis, HTN, RA, R OLIVIA here w progressive L hip pain and difficulty ambulating s/p elective L OLIVIA w Dr. Damon on 7/16 w course c/b AF w RVR and RRT for unresponsiveness on 7/16 to SICU requiring intubation (found to have metabolic acidosis w/ hypercarbia PaCO2 90s), now extubated and transferred to [facility].    Today - pt states pain is controlled. Looking forward to mobilizing more and requesting to sit in chair for meals. Denies any chest pain, palpitations, dyspnea. States she had gone to rehab after R OLIVIA previously. Eating wo N/V/Abd pain. Denies dysuria. +Flatus wo BM.    ROS: 12 point ROS reviewed and otherwise negative  FHx: Denies  SH: Non-smoker    84F with left hip pain x years that was controlled with conservative tx. Pt started using a walker in June and quickly required a wheelchair over the last month. Pt denies numbness/tingling/paresthesias to LLE. Presents today for left OLIVIA.  Pt takes Eliquis twice a day for Afib. (16 Jul 2020 12:46)      PAST MEDICAL & SURGICAL HISTORY:  Sleep apnea: does not use cpap  Seasonal allergies  Osteoporosis  Anemia  Rheumatoid arthritis  Osteoarthritis  UTI (urinary tract infection)  Hip pain  Dysuria  Stress incontinence, female  Renal insufficiency  GERD (gastroesophageal reflux disease)  HTN (hypertension)  Afib  History of nasal surgery  History of cholecystectomy  History of appendectomy  History of surgery: b/l feet with pins  History of surgery: b/l hand reconstruction x2  with plastic hardware b/l  H/O total knee replacement, bilateral    Home Meds: Home Medications:  amLODIPine 5 mg oral tablet: 1 tab(s) orally once a day (16 Jul 2020 08:25)  celecoxib 200 mg oral capsule: 1 cap(s) orally 2 times a day (16 Jul 2020 08:25)  Eliquis 2.5 mg oral tablet: 1 tab(s) orally 2 times a day (16 Jul 2020 08:25)  hydroCHLOROthiazide 12.5 mg oral tablet: 1 tab(s) orally once a day (16 Jul 2020 08:25)  Metoprolol Succinate ER 25 mg oral tablet, extended release: 1 tab(s) orally once a day (16 Jul 2020 08:25)  Nasonex 50 mcg/inh nasal spray: 2 spray(s) nasal once a day, As Needed (16 Jul 2020 08:25)  pantoprazole 20 mg oral delayed release tablet: 1 tab(s) orally once a day (16 Jul 2020 08:25)  polyethylene glycol 3350 oral powder for reconstitution: 17 gram(s) orally once a day (16 Jul 2020 08:25)    Allergies: Allergies    No Known Allergies    Intolerances      Soc:   Advanced Directives: Presumed Full Code     CURRENT MEDICATIONS:   --------------------------------------------------------------------------------------  Neurologic Medications  acetaminophen   Tablet .. 650 milliGRAM(s) Oral every 6 hours PRN Moderate Pain (4 - 6), Severe Pain (7 - 10)  celecoxib 200 milliGRAM(s) Oral two times a day  melatonin 3 milliGRAM(s) Oral at bedtime    Respiratory Medications  guaiFENesin  milliGRAM(s) Oral every 12 hours PRN Cough    Cardiovascular Medications  amLODIPine   Tablet 5 milliGRAM(s) Oral daily  hydrochlorothiazide 12.5 milliGRAM(s) Oral daily  metoprolol succinate ER 25 milliGRAM(s) Oral daily    Gastrointestinal Medications  ferrous    sulfate 325 milliGRAM(s) Oral daily  pantoprazole  Injectable 40 milliGRAM(s) IV Push every 24 hours  polyethylene glycol 3350 17 Gram(s) Oral daily  senna 2 Tablet(s) Oral at bedtime PRN Constipation    Genitourinary Medications    Hematologic/Oncologic Medications  apixaban 2.5 milliGRAM(s) Oral every 12 hours  aspirin  chewable 81 milliGRAM(s) Oral daily    Antimicrobial/Immunologic Medications    Endocrine/Metabolic Medications    Topical/Other Medications  chlorhexidine 2% Cloths 1 Application(s) Topical <User Schedule>    --------------------------------------------------------------------------------------    VITAL SIGNS, INS/OUTS (last 24 hours):  --------------------------------------------------------------------------------------  ICU Vital Signs Last 24 Hrs  T(C): 36.5 (20 Jul 2020 14:16), Max: 36.8 (19 Jul 2020 16:09)  T(F): 97.7 (20 Jul 2020 14:16), Max: 98.2 (19 Jul 2020 16:09)  HR: 102 (20 Jul 2020 14:16) (90 - 114)  BP: 125/71 (20 Jul 2020 14:16) (125/71 - 165/82)  BP(mean): --  ABP: --  ABP(mean): --  RR: 18 (20 Jul 2020 14:16) (8 - 21)  SpO2: 100% (20 Jul 2020 14:16) (99% - 100%)    I&O's Summary    19 Jul 2020 07:01  -  20 Jul 2020 07:00  --------------------------------------------------------  IN: 0 mL / OUT: 2900 mL / NET: -2900 mL    20 Jul 2020 07:01  -  20 Jul 2020 14:36  --------------------------------------------------------  IN: 0 mL / OUT: 500 mL / NET: -500 mL      --------------------------------------------------------------------------------------    EXAM:  GEN: Female in NAD on RA   HEENT: NC/AT, MMM  CV: irregularly irregular, no murmurs, no BLE edema  PULM: nml effort, CTAB  ABD: soft, NABS, non-tender to palpation  NEURO: Alert, moving all extremities. 4/5 in b/l hip and knee flexion. sensation intact. 5/5 in BUE. EOMI  PSYCH: Appropriate, conversant  SKIN: nml    LABS  --------------------------------------------------------------------------------------  Labs:  CAPILLARY BLOOD GLUCOSE                              8.2    5.02  )-----------( 225      ( 20 Jul 2020 06:19 )             25.4         07-20    137  |  101  |  12  ----------------------------<  107<H>  3.7   |  28  |  0.53      Calcium, Total Serum: 8.1 mg/dL (07-20-20 @ 06:19)      LFTs:       ABG - ( 17 Jul 2020 08:51 )  pH: 7.39  /  pCO2: 44    /  pO2: 119   / HCO3: 26    / Base Excess: 1.1   /  SaO2: 98              ABG - ( 17 Jul 2020 05:56 )  pH: 7.50  /  pCO2: 34    /  pO2: 181   / HCO3: 26    / Base Excess: 2.4   /  SaO2: 99              ABG - ( 17 Jul 2020 00:53 )  pH: 7.44  /  pCO2: 42    /  pO2: 238   / HCO3: 28    / Base Excess: 3.2   /  SaO2: 100               Coags:                  --------------------------------------------------------------------------------------    OTHER LABS    IMAGING RESULTS  ****************      IMPRESSION:     ET tube coursing into the right mainstem bronchus and should be retracted 2 cm.    No pulmonary embolism.    Debris in the upper trachea, correlate for aspiration.    Findings were discussed with patient's nurse Joel 8:37 AM.      IMPRESSION:     No acute intracranial hemorrhage, mass effect or CT evidence of recent transcortical infarction.    Parenchymal volume loss and small vessel ischemic change.    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 MRI of the orbits is suggested for further characterization.

## 2020-07-20 NOTE — OCCUPATIONAL THERAPY INITIAL EVALUATION ADULT - GENERAL OBSERVATIONS, REHAB EVAL
R hand dominant, patient cleared for OT by SHANNAN Jiang, received semi-supine, NAD, +tele, +heplock, +BLE z-flex boots.

## 2020-07-20 NOTE — PROGRESS NOTE ADULT - SUBJECTIVE AND OBJECTIVE BOX
Ortho Note    Pt comfortable without complaints, pain controlled. on nasal cannula. states did not use CPAP overnight  Denies CP, SOB, N/V, numbness/tingling     Vital Signs Last 24 Hrs  Vital Signs Last 24 Hrs  T(C): 36.5 (19 Jul 2020 21:20), Max: 37.1 (19 Jul 2020 13:19)  T(F): 97.7 (19 Jul 2020 21:20), Max: 98.7 (19 Jul 2020 13:19)  HR: 92 (20 Jul 2020 00:12) (91 - 114)  BP: 151/80 (20 Jul 2020 05:45) (151/80 - 168/65)  BP(mean): --  RR: 17 (20 Jul 2020 00:12) (12 - 21)  SpO2: 100% (20 Jul 2020 00:12) (99% - 100%)    General: Pt Alert and oriented, NAD; on NC  DSG C/D/I  Pulses: palpable dp pulse  Sensation: SILT over distal limb and symmetric to contralateral marck  Motor: firing EHL/FHL/TA/GS                          8.6    5.01  )-----------( 221      ( 19 Jul 2020 07:30 )             26.6   19 Jul 2020 07:29    136    |  102    |  16     ----------------------------<  96     4.9     |  27     |  0.58     Phos  2.5       19 Jul 2020 07:29  Mg     2.3       19 Jul 2020 07:29        A/P: 84yFemale s/p L OLIVIA c/b afib w RVR and intubation on floor secondary to unresponsiveness in setting of hypercapnia, now stepped down back to ortho tele  - Stable  - Pain Control  - DVT ppx: Asa and eliquis  - PT, WBS: TTWB  - dispo: pending PT    Ortho Pager [phone]

## 2020-07-20 NOTE — OCCUPATIONAL THERAPY INITIAL EVALUATION ADULT - ADDITIONAL COMMENTS
Patient reports ambulating w/ RW PTA, w/ increased use of WC over past month. Patient w/ shower chair for bathing, and has a chair lift inside for 1 flight of stairs. Patient has assistance for  and orders groceries for delivery.

## 2020-07-20 NOTE — OCCUPATIONAL THERAPY INITIAL EVALUATION ADULT - RANGE OF MOTION EXAMINATION, UPPER EXTREMITY
except b/l digits decreased, patient reports h/o prior digit reconstruction./bilateral UE Active ROM was WFL  (within functional limits)

## 2020-07-20 NOTE — OCCUPATIONAL THERAPY INITIAL EVALUATION ADULT - PLANNED THERAPY INTERVENTIONS, OT EVAL
transfer training/motor coordination training/balance training/ROM/strengthening/neuromuscular re-education/ADL retraining/bed mobility training/fine motor coordination training

## 2020-07-20 NOTE — OCCUPATIONAL THERAPY INITIAL EVALUATION ADULT - MD ORDER
Per chart, 84F with left hip pain x years that was controlled with conservative tx. Pt started using a walker in June and quickly required a wheelchair over the last month. Patient s/p LT 07/16/2020.

## 2020-07-20 NOTE — CONSULT NOTE ADULT - ASSESSMENT
ASSESSMENT:  84F PMH HTN, RA, presented with elective L OLIVIA and course complicated for hypotension and unresponsiveness. Patient persistently in AFib RVR- rates up to 150s. Cardiology consulted for rate control.       PLAN:  #AFib RVR  Response to 5mg IV lopressor, currently on toprol xl 25mg qD. Patient follows with Dr. Burk at [facility].   -CHADSVAS (Age >75, Female, HTN) 4- Please continue with eliquis  - Please hold amlodipine and HCTZ and increase toprol xl to 25mg BID  - Patient with increased urination likely hypovolemic- please encourage PO intake and monitor electrolytes (Maintain K>4 and Mg >2)      Discussed with Consult attending, Dr. Chamorro. ASSESSMENT:  84F PMH HTN, RA, presented with elective L OLIVIA and course complicated for hypotension and unresponsiveness. Patient persistently in AFib RVR- rates up to 150s. Cardiology consulted for rate control.       PLAN:  #AFib RVR  Response to 5mg IV lopressor, currently on toprol xl 25mg qD. Patient follows with Dr. Burk at [facility].   -CHADSVAS (Age >75, Female, HTN) 4- Please continue with eliquis  - Please hold amlodipine and HCTZ and increase toprol xl to 25mg BID  -Please obtain echocardiogram  - Please obtain TSH to AM labs  - Patient with increased urination likely hypovolemic- please encourage PO intake and monitor electrolytes (Maintain K>4 and Mg >2)      Discussed with Consult attending, Dr. Chamorro.

## 2020-07-21 LAB
ANION GAP SERPL CALC-SCNC: 12 MMOL/L — SIGNIFICANT CHANGE UP (ref 5–17)
BUN SERPL-MCNC: 9 MG/DL — SIGNIFICANT CHANGE UP (ref 7–23)
CALCIUM SERPL-MCNC: 8.5 MG/DL — SIGNIFICANT CHANGE UP (ref 8.4–10.5)
CHLORIDE SERPL-SCNC: 96 MMOL/L — SIGNIFICANT CHANGE UP (ref 96–108)
CO2 SERPL-SCNC: 28 MMOL/L — SIGNIFICANT CHANGE UP (ref 22–31)
CREAT SERPL-MCNC: 0.42 MG/DL — LOW (ref 0.5–1.3)
GLUCOSE SERPL-MCNC: 111 MG/DL — HIGH (ref 70–99)
HCT VFR BLD CALC: 29.4 % — LOW (ref 34.5–45)
HGB BLD-MCNC: 9.4 G/DL — LOW (ref 11.5–15.5)
MAGNESIUM SERPL-MCNC: 1.7 MG/DL — SIGNIFICANT CHANGE UP (ref 1.6–2.6)
MCHC RBC-ENTMCNC: 30 PG — SIGNIFICANT CHANGE UP (ref 27–34)
MCHC RBC-ENTMCNC: 32 GM/DL — SIGNIFICANT CHANGE UP (ref 32–36)
MCV RBC AUTO: 93.9 FL — SIGNIFICANT CHANGE UP (ref 80–100)
NRBC # BLD: 0 /100 WBCS — SIGNIFICANT CHANGE UP (ref 0–0)
PHOSPHATE SERPL-MCNC: 2.5 MG/DL — SIGNIFICANT CHANGE UP (ref 2.5–4.5)
PLATELET # BLD AUTO: 292 K/UL — SIGNIFICANT CHANGE UP (ref 150–400)
POTASSIUM SERPL-MCNC: 3.2 MMOL/L — LOW (ref 3.5–5.3)
POTASSIUM SERPL-SCNC: 3.2 MMOL/L — LOW (ref 3.5–5.3)
RBC # BLD: 3.13 M/UL — LOW (ref 3.8–5.2)
RBC # FLD: 12.7 % — SIGNIFICANT CHANGE UP (ref 10.3–14.5)
SODIUM SERPL-SCNC: 136 MMOL/L — SIGNIFICANT CHANGE UP (ref 135–145)
TSH SERPL-MCNC: 0.85 UIU/ML — SIGNIFICANT CHANGE UP (ref 0.35–4.94)
WBC # BLD: 5.83 K/UL — SIGNIFICANT CHANGE UP (ref 3.8–10.5)
WBC # FLD AUTO: 5.83 K/UL — SIGNIFICANT CHANGE UP (ref 3.8–10.5)

## 2020-07-21 PROCEDURE — 99233 SBSQ HOSP IP/OBS HIGH 50: CPT

## 2020-07-21 PROCEDURE — 93306 TTE W/DOPPLER COMPLETE: CPT | Mod: 26

## 2020-07-21 PROCEDURE — 99232 SBSQ HOSP IP/OBS MODERATE 35: CPT

## 2020-07-21 RX ORDER — MAGNESIUM SULFATE 500 MG/ML
1 VIAL (ML) INJECTION ONCE
Refills: 0 | Status: COMPLETED | OUTPATIENT
Start: 2020-07-21 | End: 2020-07-21

## 2020-07-21 RX ORDER — AMLODIPINE BESYLATE 2.5 MG/1
5 TABLET ORAL DAILY
Refills: 0 | Status: DISCONTINUED | OUTPATIENT
Start: 2020-07-21 | End: 2020-07-22

## 2020-07-21 RX ORDER — POTASSIUM CHLORIDE 20 MEQ
40 PACKET (EA) ORAL EVERY 4 HOURS
Refills: 0 | Status: COMPLETED | OUTPATIENT
Start: 2020-07-21 | End: 2020-07-21

## 2020-07-21 RX ORDER — MUPIROCIN 20 MG/G
1 OINTMENT TOPICAL
Refills: 0 | Status: DISCONTINUED | OUTPATIENT
Start: 2020-07-21 | End: 2020-07-22

## 2020-07-21 RX ORDER — HYDRALAZINE HCL 50 MG
5 TABLET ORAL ONCE
Refills: 0 | Status: DISCONTINUED | OUTPATIENT
Start: 2020-07-21 | End: 2020-07-22

## 2020-07-21 RX ORDER — ACETAMINOPHEN 500 MG
2 TABLET ORAL
Qty: 0 | Refills: 0 | DISCHARGE
Start: 2020-07-21

## 2020-07-21 RX ORDER — METOPROLOL TARTRATE 50 MG
1 TABLET ORAL
Qty: 0 | Refills: 0 | DISCHARGE

## 2020-07-21 RX ORDER — SENNA PLUS 8.6 MG/1
2 TABLET ORAL
Qty: 0 | Refills: 0 | DISCHARGE
Start: 2020-07-21

## 2020-07-21 RX ORDER — HYDRALAZINE HCL 50 MG
5 TABLET ORAL ONCE
Refills: 0 | Status: COMPLETED | OUTPATIENT
Start: 2020-07-21 | End: 2020-07-21

## 2020-07-21 RX ORDER — FERROUS SULFATE 325(65) MG
1 TABLET ORAL
Qty: 0 | Refills: 0 | DISCHARGE
Start: 2020-07-21

## 2020-07-21 RX ORDER — POTASSIUM CHLORIDE 20 MEQ
40 PACKET (EA) ORAL EVERY 4 HOURS
Refills: 0 | Status: DISCONTINUED | OUTPATIENT
Start: 2020-07-21 | End: 2020-07-21

## 2020-07-21 RX ORDER — METOPROLOL TARTRATE 50 MG
1 TABLET ORAL
Qty: 0 | Refills: 0 | DISCHARGE
Start: 2020-07-21

## 2020-07-21 RX ORDER — ASPIRIN/CALCIUM CARB/MAGNESIUM 324 MG
1 TABLET ORAL
Qty: 0 | Refills: 0 | DISCHARGE
Start: 2020-07-21

## 2020-07-21 RX ADMIN — Medication 5 MILLIGRAM(S): at 00:18

## 2020-07-21 RX ADMIN — APIXABAN 2.5 MILLIGRAM(S): 2.5 TABLET, FILM COATED ORAL at 19:06

## 2020-07-21 RX ADMIN — CELECOXIB 200 MILLIGRAM(S): 200 CAPSULE ORAL at 05:31

## 2020-07-21 RX ADMIN — Medication 3 MILLIGRAM(S): at 21:47

## 2020-07-21 RX ADMIN — APIXABAN 2.5 MILLIGRAM(S): 2.5 TABLET, FILM COATED ORAL at 05:31

## 2020-07-21 RX ADMIN — Medication 40 MILLIEQUIVALENT(S): at 12:56

## 2020-07-21 RX ADMIN — Medication 81 MILLIGRAM(S): at 12:55

## 2020-07-21 RX ADMIN — Medication 325 MILLIGRAM(S): at 12:55

## 2020-07-21 RX ADMIN — CHLORHEXIDINE GLUCONATE 1 APPLICATION(S): 213 SOLUTION TOPICAL at 05:44

## 2020-07-21 RX ADMIN — Medication 40 MILLIEQUIVALENT(S): at 21:46

## 2020-07-21 RX ADMIN — PANTOPRAZOLE SODIUM 40 MILLIGRAM(S): 20 TABLET, DELAYED RELEASE ORAL at 05:31

## 2020-07-21 RX ADMIN — CELECOXIB 200 MILLIGRAM(S): 200 CAPSULE ORAL at 06:00

## 2020-07-21 RX ADMIN — AMLODIPINE BESYLATE 5 MILLIGRAM(S): 2.5 TABLET ORAL at 05:42

## 2020-07-21 RX ADMIN — Medication 40 MILLIEQUIVALENT(S): at 16:14

## 2020-07-21 RX ADMIN — MUPIROCIN 1 APPLICATION(S): 20 OINTMENT TOPICAL at 21:47

## 2020-07-21 RX ADMIN — Medication 100 GRAM(S): at 12:56

## 2020-07-21 RX ADMIN — Medication 2 SPRAY(S): at 17:27

## 2020-07-21 RX ADMIN — Medication 5 MILLIGRAM(S): at 00:45

## 2020-07-21 RX ADMIN — Medication 25 MILLIGRAM(S): at 17:50

## 2020-07-21 RX ADMIN — CELECOXIB 200 MILLIGRAM(S): 200 CAPSULE ORAL at 17:50

## 2020-07-21 RX ADMIN — Medication 25 MILLIGRAM(S): at 05:31

## 2020-07-21 NOTE — PROGRESS NOTE ADULT - ATTENDING COMMENTS
HILARY Linton has acted as my scribe.
Patient seen and examined with house-staff during bedside rounds  Resident note read, including vitals, physical findings, laboratory data, and radiological reports.   Revisions included below.  Case discussed with House staff  Direct personal management at bedside  and extensive interpretation of data. Decision making of high complexity.
84F w/ h/o Atrial fibrillation, HTN and JENSEN admitted for hip repair, consulted for post-op afib with RVR  - BB restarted, increased  Toprol to 25 po q12h  - DC'd HCTZ given pt likely over diuresed ( labs and I/Os reviewed)  - encourage PO fluids  - TSH and Echo wnl  - cont NOAC  - HR now controlled  - pt to f/u with her outpatient cardiologist for continued cardiac care

## 2020-07-21 NOTE — CHART NOTE - NSCHARTNOTEFT_GEN_A_CORE
Admitting Diagnosis:   Patient is a 84y old  Female who presents with a chief complaint of left hip pain (21 Jul 2020 12:22)    PAST MEDICAL & SURGICAL HISTORY:  Sleep apnea: does not use cpap  Seasonal allergies  Osteoporosis  Anemia  Rheumatoid arthritis  Osteoarthritis  UTI (urinary tract infection)  Hip pain  Dysuria  Stress incontinence, female  Renal insufficiency  GERD (gastroesophageal reflux disease)  HTN (hypertension)  Afib  History of nasal surgery  History of cholecystectomy  History of appendectomy  History of surgery: b/l feet with pins  History of surgery: b/l hand reconstruction x2  with plastic hardware b/l  H/O total knee replacement, bilateral    Current Nutrition Order: Regular diet     PO Intake: Good (%) [   ]  Fair (50-75%) [ x  ] Poor (<25%) [   ]- Pt consuming ~50% of meals.     GI Issues:   WDL, last BM 7/19  No n/v/d/c noted  No chewing or swallowing impairments noted    Pain:  No pain noted at this time    Skin Integrity:  Surgical incision, darian score 16  No pressure ulcers noted  No edema present    Labs:   07-21    136  |  96  |  9   ----------------------------<  111<H>  3.2<L>   |  28  |  0.42<L>    Ca    8.5      21 Jul 2020 07:41  Phos  2.5     07-21  Mg     1.7     07-21    Medications:  MEDICATIONS  (STANDING):  amLODIPine   Tablet 5 milliGRAM(s) Oral daily  apixaban 2.5 milliGRAM(s) Oral every 12 hours  aspirin  chewable 81 milliGRAM(s) Oral daily  celecoxib 200 milliGRAM(s) Oral two times a day  chlorhexidine 2% Cloths 1 Application(s) Topical <User Schedule>  ferrous    sulfate 325 milliGRAM(s) Oral daily  fluticasone propionate 50 MICROgram(s)/spray Nasal Spray 2 Spray(s) Both Nostrils daily  hydrALAZINE Injectable 5 milliGRAM(s) IV Push once  melatonin 3 milliGRAM(s) Oral at bedtime  metoprolol succinate ER 25 milliGRAM(s) Oral two times a day  pantoprazole  Injectable 40 milliGRAM(s) IV Push every 24 hours  polyethylene glycol 3350 17 Gram(s) Oral daily  potassium chloride    Tablet ER 40 milliEquivalent(s) Oral every 4 hours    MEDICATIONS  (PRN):  acetaminophen   Tablet .. 650 milliGRAM(s) Oral every 6 hours PRN Moderate Pain (4 - 6), Severe Pain (7 - 10)  guaiFENesin  milliGRAM(s) Oral every 12 hours PRN Cough  senna 2 Tablet(s) Oral at bedtime PRN Constipation    Admitted Anthropometrics:  Height: 5'5", IBW 125lbs+/-10%, %IBW 88%, BMI 18.3     Weight: 110lbs (7/16)    Daily     Weight Change:   Of note pt reported weight loss over the last year. Was 150lbs (10 yrs ago), 127 (1.5yrs ago), and now 110lbs. Pt attributes recent weight loss to hip issues and being in pain/loss of appetite. This indicates a 13.3% wt change x1.5yrs. Pt noted to have severe muscle wasting most notable by protruding acromion process and hallowing of temporals.  Nutrition Focused Physical Exam: Completed [  x ]  Unable to complete [   ]    Estimated energy needs:   ABW (50kg) used for calculations as pt between % of IBW.   Nutrient needs based on [facility] standards of care for maintenance in older adults.  Needs adjusted for age, post-op healing, suspected severe PCM  1250-1500kcal/day (25-30kcal/kg)  70-80g pro/day (1.4-1.6g pro/kg)  Fluids per team    Subjective:   84F PMHx afib (on eliquis), HTN, JENSEN, OA/RA, b/l hand/knee/feet surgery, recently s/p R OLIVIA (5/17) presented to [facility] for elective L total hip replacement today. Now POD#0 s/p L OLIVIA c/b afib with RVR in PACU, found to be unresponsive on floors now s/p emergent intubation. Pt was transferred from SICU to [facility] step down unit for further care. Plan for d/c to [facility] pending stabilization of BP and afib- BP improving, team continuing increased dose of metoprolol. On assessment, pt is resting in bed comfortably. Currently on regular diet, tolerating PO well. Pt consuming ~50% of meals. Denies n/v/d/c. Cont to encourage adequate PO intake. Reenforcement on increased kcal, pro needs 2/2 wound healing an prevention of further weight loss- pt receptive. Please see recs below to best fit needs- team paged. RD to follow up per protocol.     Nutrition Diagnosis:  increased nutrient needs RT increased demand for kcal/pro AEB age, post-op healing, suspected malnutrition    Goal:  Pt to consistently meet % of estimated needs PO     Recommendations:  1. Cont with regular diet  2. Recommend adding on EnsureEnlive TID (1050kcal, 60g pro)  3. Encourage ONS between meals  4. MVI daily  5. Obtain updated weights for accuracy and trending.     Education:   Reenforcement on increased kcal, pro needs 2/2 wound healing an prevention of further weight loss    Risk Level: High [ x  ] Moderate [   ] Low [   ].

## 2020-07-21 NOTE — PROGRESS NOTE ADULT - SUBJECTIVE AND OBJECTIVE BOX
Cardiology Consult    O/N: Converted to NSR around 5 PM last night, remains in sinus, rates controlled  Interval History: No complaints- no chest pain, no shortness of breath, no palpitations. Denies pain at surgical site.  Telemetry: Converted to NSR around 5 PM last night, remains in sinus      OBJECTIVE  Vitals:  T(C): 36.8 (07-21-20 @ 09:05), Max: 36.8 (07-21-20 @ 09:05)  HR: 78 (07-21-20 @ 09:38) (70 - 107)  BP: 162/75 (07-21-20 @ 09:05) (107/62 - 192/81)  RR: 18 (07-21-20 @ 09:38) (17 - 24)  SpO2: 97% (07-21-20 @ 09:38) (96% - 100%)  Wt(kg): --    I/O:  I&O's Summary    20 Jul 2020 07:01  -  21 Jul 2020 07:00  --------------------------------------------------------  IN: 950 mL / OUT: 2500 mL / NET: -1550 mL    21 Jul 2020 07:01  -  21 Jul 2020 12:22  --------------------------------------------------------  IN: 0 mL / OUT: 200 mL / NET: -200 mL        PHYSICAL EXAM:  Appearance: NAD. Speaking in full sentences.   HEENT: Conjunctiva clear b/l. Moist oral mucosa. No JVD  Cardiovascular: RRR, S1, S2 with no murmurs.  Respiratory: Lungs CTAB.   Gastrointestinal:  Soft, nontender. Non-distended. Non-rigid.	  Extremities: No edema b/l. No erythema b/l. LE WWP b/l.  Vascular: DP intact  Neurologic:  Alert and awake. Moving all extremities. Following commands.   	  LABS:                        9.4    5.83  )-----------( 292      ( 21 Jul 2020 07:41 )             29.4     07-21    136  |  96  |  9   ----------------------------<  111<H>  3.2<L>   |  28  |  0.42<L>    Ca    8.5      21 Jul 2020 07:41  Phos  2.5     07-21  Mg     1.7     07-21            RADIOLOGY & ADDITIONAL TESTS:  Reviewed .    MEDICATIONS  (STANDING):  amLODIPine   Tablet 5 milliGRAM(s) Oral daily  apixaban 2.5 milliGRAM(s) Oral every 12 hours  aspirin  chewable 81 milliGRAM(s) Oral daily  celecoxib 200 milliGRAM(s) Oral two times a day  chlorhexidine 2% Cloths 1 Application(s) Topical <User Schedule>  ferrous    sulfate 325 milliGRAM(s) Oral daily  fluticasone propionate 50 MICROgram(s)/spray Nasal Spray 2 Spray(s) Both Nostrils daily  hydrALAZINE Injectable 5 milliGRAM(s) IV Push once  magnesium sulfate  IVPB 1 Gram(s) IV Intermittent once  melatonin 3 milliGRAM(s) Oral at bedtime  metoprolol succinate ER 25 milliGRAM(s) Oral two times a day  pantoprazole  Injectable 40 milliGRAM(s) IV Push every 24 hours  polyethylene glycol 3350 17 Gram(s) Oral daily  potassium chloride    Tablet ER 40 milliEquivalent(s) Oral every 4 hours    MEDICATIONS  (PRN):  acetaminophen   Tablet .. 650 milliGRAM(s) Oral every 6 hours PRN Moderate Pain (4 - 6), Severe Pain (7 - 10)  guaiFENesin  milliGRAM(s) Oral every 12 hours PRN Cough  senna 2 Tablet(s) Oral at bedtime PRN Constipation

## 2020-07-21 NOTE — PROGRESS NOTE ADULT - ASSESSMENT
84F w h/o AF on eliquis, HTN, RA, R OLIVIA here w progressive L hip pain and difficulty ambulating s/p elective L OLIVIA w Dr. Damon on 7/16 w course c/b AF w RVR and RRT for unresponsiveness on 7/16 to SICU requiring intubation (found to have metabolic acidosis w/ hypercarbia PaCO2 90s), now extubated and transferred to [facility].    #Post-op state. Pain appears controlled. On bowel regimen and IS. PPx: On eliquis and ASA. DISPO: [facility]  #AF - Rate controlled; rate improved. TOprol increased to 25 BID. c/w AC (reduced dose 2.5mg BID as meets criteria for wt and age)  #HTN - at target. On home regimen w amlodipine and HCTZ  #Normocytic anemia w DENISSE. hgb increased 9.4 from 8.2 from 8.6. Appears asymptomatic. Post-op has been in 8-9s. Pre-op pt was 11 however 2019 post-op hgb also in 8-9 range.   #Hyponatremia - since resolved  #RA - chronic. takes celebrex    Plan  --HR improved significantly w BP preserved  --Continue w amlodipine and continue holding diuretics at this time. If BP consistently > 160 would consider increasing amlodipine to 10mg daily prior to diuretic   --Supplement K/Mg to >4 and 2  --Appreciate/Follow-up cardiology recommendations    Dispo: Pt for [facility] likely within 24h

## 2020-07-21 NOTE — PROGRESS NOTE ADULT - ASSESSMENT
ASSESSMENT:  84F PMH HTN, RA, presented with elective L OLIVIA and course complicated for hypotension and unresponsiveness. Patient persistently in AFib RVR- rates up to 150s. Cardiology consulted for rate control.       PLAN:  #AFib RVR  Response to 5mg IV lopressor, currently on toprol xl 25mg qD. Patient follows with Dr. Burk at [facility].  CHADSVASC 4 (Age> 75, Female, Hx of HTN)  - Continue with Eliquis      INCOMPLETE PENDING ROUNDS ASSESSMENT:  84F PMH HTN, RA, presented with elective L OLIVIA and course complicated for hypotension and unresponsiveness. Patient persistently in AFib RVR- rates up to 150s. Cardiology consulted for rate control.       PLAN:  #AFib RVR  Response to 5mg IV lopressor, currently on toprol xl 25mg qD. Patient follows with Dr. Burk at [facility].  [facility] 4 (Age> 75, Female, Hx of HTN)  TTE (7/21): EF 65-70%, no RMWA, LA normal, no pulm HTN, trace MR  - Continue with Eliquis  - Can continue with amlodipine for BP management, please hold diuretic in setting of hypovolemia and electrolyte abnormalities  - continue with metoprolol 25 xl BID  - Please assure that patient has follow up with her Cardiologist, Dr. Burk within 1 week of discharge.     Discussed with Cardiology consult attending, Dr. Chamorro. ASSESSMENT:  84F PMH HTN, RA, presented with elective L OLIVIA and course complicated for hypotension and unresponsiveness. Patient persistently in AFib RVR- rates up to 150s. Cardiology consulted for rate control.       PLAN:  #AFib RVR  Response to 5mg IV lopressor, currently on toprol xl 25mg qD. Patient follows with Dr. Burk at [facility].  [facility] 4 (Age> 75, Female, Hx of HTN)  TTE (7/21): EF 65-70%, no RMWA, LA normal, no pulm HTN, trace MR  - Continue with Eliquis  - Can continue with amlodipine for BP management, please hold diuretic in setting of hypovolemia and electrolyte abnormalities  - continue with metoprolol 25 xl BID  - Please assure that patient has follow up with her Cardiologist, Dr. Burk within 1 week of discharge.     Discussed with Cardiology consult attending, Dr. Chamorro.

## 2020-07-21 NOTE — PROGRESS NOTE ADULT - SUBJECTIVE AND OBJECTIVE BOX
Ortho Note    Pt comfortable without complaints, pain controlled  Denies CP, SOB, N/V, numbness/tingling     Vital Signs Last 24 Hrs  T(C): --  T(F): --  HR: 70 (07-21-20 @ 06:04) (70 - 88)  BP: 178/84 (07-21-20 @ 05:45) (153/67 - 184/80)  BP(mean): --  RR: 20 (07-21-20 @ 06:04) (18 - 24)  SpO2: 96% (07-21-20 @ 06:04) (96% - 100%)  AVSS    General: Pt Alert and oriented, NAD  DSG C/D/I  Pulses: papable DP pulse  Sensation: SILT over distal limb and symmetric to contralateral side  Motor: firing EHL/FHL/TA/GS                          8.2    5.02  )-----------( 225      ( 20 Jul 2020 06:19 )             25.4   20 Jul 2020 06:19    137    |  101    |  12     ----------------------------<  107    3.7     |  28     |  0.53     Phos  2.9       20 Jul 2020 06:19  Mg     2.0       20 Jul 2020 06:19        A/P: 84yFemale s/p L OLIVIA c/b inubation on floor and transfer to micu now on tele monitoring ww labile pressures and heart rate, cards on board  - Afib - toprolol 25 BID as per cards  - HTN - amlodipine restarted  - dvt: Asa and eliquis  - pain control   - PT: TTWB  - dispo: pending [facility]    Ortho Pager [phone]

## 2020-07-21 NOTE — PROGRESS NOTE ADULT - SUBJECTIVE AND OBJECTIVE BOX
INTERVAL HPI/OVERNIGHT EVENTS: BP elevated Overnight and started on amlodipine this AM    SUBJECTIVE: Patient seen and examined at bedside.   Pt states pain is well controlled. Anxious about BP but denies any headache, vision changes, weakness/numbness, chest pain, dyspnea. Eating wo n/V/Abd pain. +BM    OBJECTIVE:    VITAL SIGNS:  ICU Vital Signs Last 24 Hrs  T(C): 36.9 (21 Jul 2020 16:37), Max: 36.9 (21 Jul 2020 16:37)  T(F): 98.4 (21 Jul 2020 16:37), Max: 98.4 (21 Jul 2020 16:37)  HR: 85 (21 Jul 2020 16:37) (70 - 96)  BP: 164/77 (21 Jul 2020 16:37) (147/77 - 192/81)  BP(mean): --  ABP: --  ABP(mean): --  RR: 16 (21 Jul 2020 16:37) (16 - 24)  SpO2: 100% (21 Jul 2020 16:37) (95% - 100%)        07-20 @ 07:01  -  07-21 @ 07:00  --------------------------------------------------------  IN: 950 mL / OUT: 2500 mL / NET: -1550 mL    07-21 @ 07:01  -  07-21 @ 19:05  --------------------------------------------------------  IN: 660 mL / OUT: 200 mL / NET: 460 mL      CAPILLARY BLOOD GLUCOSE          PHYSICAL EXAM:  GEN: Female in NAD on RA   HEENT: NC/AT, MMM  CV: irregularly irregular, no murmurs, no BLE edema  PULM: nml effort, CTAB  ABD: soft, NABS, non-tender to palpation  NEURO: Alert, moving all extremities. 4/5 in b/l hip and knee flexion. sensation intact. 5/5 in BUE. EOMI  PSYCH: Appropriate, conversant  SKIN: nml    MEDICATIONS:  MEDICATIONS  (STANDING):  amLODIPine   Tablet 5 milliGRAM(s) Oral daily  apixaban 2.5 milliGRAM(s) Oral every 12 hours  aspirin  chewable 81 milliGRAM(s) Oral daily  celecoxib 200 milliGRAM(s) Oral two times a day  chlorhexidine 2% Cloths 1 Application(s) Topical <User Schedule>  ferrous    sulfate 325 milliGRAM(s) Oral daily  fluticasone propionate 50 MICROgram(s)/spray Nasal Spray 2 Spray(s) Both Nostrils daily  hydrALAZINE Injectable 5 milliGRAM(s) IV Push once  melatonin 3 milliGRAM(s) Oral at bedtime  metoprolol succinate ER 25 milliGRAM(s) Oral two times a day  pantoprazole  Injectable 40 milliGRAM(s) IV Push every 24 hours  polyethylene glycol 3350 17 Gram(s) Oral daily  potassium chloride    Tablet ER 40 milliEquivalent(s) Oral every 4 hours    MEDICATIONS  (PRN):  acetaminophen   Tablet .. 650 milliGRAM(s) Oral every 6 hours PRN Moderate Pain (4 - 6), Severe Pain (7 - 10)  guaiFENesin  milliGRAM(s) Oral every 12 hours PRN Cough  senna 2 Tablet(s) Oral at bedtime PRN Constipation      ALLERGIES:  Allergies    No Known Allergies    Intolerances        LABS:                        9.4    5.83  )-----------( 292      ( 21 Jul 2020 07:41 )             29.4     07-21    136  |  96  |  9   ----------------------------<  111<H>  3.2<L>   |  28  |  0.42<L>    Ca    8.5      21 Jul 2020 07:41  Phos  2.5     07-21  Mg     1.7     07-21            RADIOLOGY & ADDITIONAL TESTS: Reviewed.

## 2020-07-21 NOTE — PROGRESS NOTE ADULT - SUBJECTIVE AND OBJECTIVE BOX
Ortho Note    Pt comfortable without complaints, pain controlled  Denies CP, SOB, N/V, numbness/tingling     Vital Signs Last 24 Hrs  T(C): 36.8 (07-21-20 @ 09:05), Max: 36.8 (07-21-20 @ 09:05)  T(F): 98.2 (07-21-20 @ 09:05), Max: 98.2 (07-21-20 @ 09:05)  HR: 78 (07-21-20 @ 09:38) (78 - 87)  BP: 162/75 (07-21-20 @ 09:05) (162/75 - 174/71)  BP(mean): --  RR: 18 (07-21-20 @ 09:38) (17 - 18)  SpO2: 97% (07-21-20 @ 09:38) (97% - 99%)  AVSS    focused exam:  General: Pt Alert and oriented, NAD  DSG C/D/I  Pulses: +2DP, WWP feet  Sensation: SILT BLE  Motor: 5/5 EHL/FHL/TA/GS                          9.4    5.83  )-----------( 292      ( 21 Jul 2020 07:41 )             29.4   21 Jul 2020 07:41    136    |  96     |  9      ----------------------------<  111    3.2     |  28     |  0.42     Ca    8.5        21 Jul 2020 07:41  Phos  2.5       21 Jul 2020 07:41  Mg     1.7       21 Jul 2020 07:41        A/P: 84yFemale POD#5 s/p left total hip replacement with possible intra-op acetabular fx with course complicated by admission to ICU post-op and uncontrolled hypertension and afib  - BP improving after receiving amlodipine this AM; continuing increased dose of metoprolol; appreciate medicine recs  - Mg 1.6, K 3.2- supplementing today  - echo today as per cardiology  - Pain Control  - DVT ppx: eliquis  - PT, WBS: TTWB LLE  - OOB for meals as tolerated, I/S  - continue bowel regimen  - dispo: [facility] pending stabilization of BP and afib    Ortho Pager [phone]

## 2020-07-22 ENCOUNTER — TRANSCRIPTION ENCOUNTER (OUTPATIENT)
Age: 85
End: 2020-07-22

## 2020-07-22 VITALS — OXYGEN SATURATION: 98 % | DIASTOLIC BLOOD PRESSURE: 70 MMHG | HEART RATE: 82 BPM | SYSTOLIC BLOOD PRESSURE: 151 MMHG

## 2020-07-22 LAB
ANION GAP SERPL CALC-SCNC: 7 MMOL/L — SIGNIFICANT CHANGE UP (ref 5–17)
BUN SERPL-MCNC: 9 MG/DL — SIGNIFICANT CHANGE UP (ref 7–23)
CALCIUM SERPL-MCNC: 8.9 MG/DL — SIGNIFICANT CHANGE UP (ref 8.4–10.5)
CHLORIDE SERPL-SCNC: 97 MMOL/L — SIGNIFICANT CHANGE UP (ref 96–108)
CO2 SERPL-SCNC: 29 MMOL/L — SIGNIFICANT CHANGE UP (ref 22–31)
CREAT SERPL-MCNC: 0.52 MG/DL — SIGNIFICANT CHANGE UP (ref 0.5–1.3)
GLUCOSE SERPL-MCNC: 101 MG/DL — HIGH (ref 70–99)
HCT VFR BLD CALC: 30.7 % — LOW (ref 34.5–45)
HGB BLD-MCNC: 9.6 G/DL — LOW (ref 11.5–15.5)
MAGNESIUM SERPL-MCNC: 1.9 MG/DL — SIGNIFICANT CHANGE UP (ref 1.6–2.6)
MCHC RBC-ENTMCNC: 29.9 PG — SIGNIFICANT CHANGE UP (ref 27–34)
MCHC RBC-ENTMCNC: 31.3 GM/DL — LOW (ref 32–36)
MCV RBC AUTO: 95.6 FL — SIGNIFICANT CHANGE UP (ref 80–100)
NRBC # BLD: 0 /100 WBCS — SIGNIFICANT CHANGE UP (ref 0–0)
PHOSPHATE SERPL-MCNC: 3 MG/DL — SIGNIFICANT CHANGE UP (ref 2.5–4.5)
PLATELET # BLD AUTO: 333 K/UL — SIGNIFICANT CHANGE UP (ref 150–400)
POTASSIUM SERPL-MCNC: 5 MMOL/L — SIGNIFICANT CHANGE UP (ref 3.5–5.3)
POTASSIUM SERPL-SCNC: 5 MMOL/L — SIGNIFICANT CHANGE UP (ref 3.5–5.3)
RBC # BLD: 3.21 M/UL — LOW (ref 3.8–5.2)
RBC # FLD: 12.6 % — SIGNIFICANT CHANGE UP (ref 10.3–14.5)
SODIUM SERPL-SCNC: 133 MMOL/L — LOW (ref 135–145)
WBC # BLD: 5.3 K/UL — SIGNIFICANT CHANGE UP (ref 3.8–10.5)
WBC # FLD AUTO: 5.3 K/UL — SIGNIFICANT CHANGE UP (ref 3.8–10.5)

## 2020-07-22 PROCEDURE — 83930 ASSAY OF BLOOD OSMOLALITY: CPT

## 2020-07-22 PROCEDURE — 83935 ASSAY OF URINE OSMOLALITY: CPT

## 2020-07-22 PROCEDURE — 84100 ASSAY OF PHOSPHORUS: CPT

## 2020-07-22 PROCEDURE — 71275 CT ANGIOGRAPHY CHEST: CPT

## 2020-07-22 PROCEDURE — 83735 ASSAY OF MAGNESIUM: CPT

## 2020-07-22 PROCEDURE — 85027 COMPLETE CBC AUTOMATED: CPT

## 2020-07-22 PROCEDURE — 71045 X-RAY EXAM CHEST 1 VIEW: CPT

## 2020-07-22 PROCEDURE — 83605 ASSAY OF LACTIC ACID: CPT

## 2020-07-22 PROCEDURE — 82803 BLOOD GASES ANY COMBINATION: CPT

## 2020-07-22 PROCEDURE — 85025 COMPLETE CBC W/AUTO DIFF WBC: CPT

## 2020-07-22 PROCEDURE — 84443 ASSAY THYROID STIM HORMONE: CPT

## 2020-07-22 PROCEDURE — 36415 COLL VENOUS BLD VENIPUNCTURE: CPT

## 2020-07-22 PROCEDURE — 93005 ELECTROCARDIOGRAM TRACING: CPT

## 2020-07-22 PROCEDURE — 83036 HEMOGLOBIN GLYCOSYLATED A1C: CPT

## 2020-07-22 PROCEDURE — 82728 ASSAY OF FERRITIN: CPT

## 2020-07-22 PROCEDURE — 85730 THROMBOPLASTIN TIME PARTIAL: CPT

## 2020-07-22 PROCEDURE — 97535 SELF CARE MNGMENT TRAINING: CPT

## 2020-07-22 PROCEDURE — 84300 ASSAY OF URINE SODIUM: CPT

## 2020-07-22 PROCEDURE — 97110 THERAPEUTIC EXERCISES: CPT

## 2020-07-22 PROCEDURE — 82962 GLUCOSE BLOOD TEST: CPT

## 2020-07-22 PROCEDURE — 86901 BLOOD TYPING SEROLOGIC RH(D): CPT

## 2020-07-22 PROCEDURE — 97161 PT EVAL LOW COMPLEX 20 MIN: CPT

## 2020-07-22 PROCEDURE — 97530 THERAPEUTIC ACTIVITIES: CPT

## 2020-07-22 PROCEDURE — 84484 ASSAY OF TROPONIN QUANT: CPT

## 2020-07-22 PROCEDURE — C1713: CPT

## 2020-07-22 PROCEDURE — C1776: CPT

## 2020-07-22 PROCEDURE — 70450 CT HEAD/BRAIN W/O DYE: CPT

## 2020-07-22 PROCEDURE — 80048 BASIC METABOLIC PNL TOTAL CA: CPT

## 2020-07-22 PROCEDURE — 94640 AIRWAY INHALATION TREATMENT: CPT

## 2020-07-22 PROCEDURE — 94002 VENT MGMT INPAT INIT DAY: CPT

## 2020-07-22 PROCEDURE — 84439 ASSAY OF FREE THYROXINE: CPT

## 2020-07-22 PROCEDURE — 86850 RBC ANTIBODY SCREEN: CPT

## 2020-07-22 PROCEDURE — 72170 X-RAY EXAM OF PELVIS: CPT

## 2020-07-22 PROCEDURE — 99233 SBSQ HOSP IP/OBS HIGH 50: CPT

## 2020-07-22 PROCEDURE — 83550 IRON BINDING TEST: CPT

## 2020-07-22 PROCEDURE — 83540 ASSAY OF IRON: CPT

## 2020-07-22 PROCEDURE — 93306 TTE W/DOPPLER COMPLETE: CPT

## 2020-07-22 PROCEDURE — 94660 CPAP INITIATION&MGMT: CPT

## 2020-07-22 PROCEDURE — 85610 PROTHROMBIN TIME: CPT

## 2020-07-22 PROCEDURE — 80053 COMPREHEN METABOLIC PANEL: CPT

## 2020-07-22 RX ORDER — AMLODIPINE BESYLATE 2.5 MG/1
1 TABLET ORAL
Qty: 0 | Refills: 0 | DISCHARGE
Start: 2020-07-22

## 2020-07-22 RX ORDER — AMLODIPINE BESYLATE 2.5 MG/1
5 TABLET ORAL ONCE
Refills: 0 | Status: COMPLETED | OUTPATIENT
Start: 2020-07-22 | End: 2020-07-22

## 2020-07-22 RX ORDER — AMLODIPINE BESYLATE 2.5 MG/1
10 TABLET ORAL DAILY
Refills: 0 | Status: DISCONTINUED | OUTPATIENT
Start: 2020-07-23 | End: 2020-07-22

## 2020-07-22 RX ADMIN — Medication 325 MILLIGRAM(S): at 12:26

## 2020-07-22 RX ADMIN — Medication 81 MILLIGRAM(S): at 12:26

## 2020-07-22 RX ADMIN — Medication 25 MILLIGRAM(S): at 05:03

## 2020-07-22 RX ADMIN — APIXABAN 2.5 MILLIGRAM(S): 2.5 TABLET, FILM COATED ORAL at 05:04

## 2020-07-22 RX ADMIN — MUPIROCIN 1 APPLICATION(S): 20 OINTMENT TOPICAL at 12:26

## 2020-07-22 RX ADMIN — AMLODIPINE BESYLATE 5 MILLIGRAM(S): 2.5 TABLET ORAL at 05:03

## 2020-07-22 RX ADMIN — CELECOXIB 200 MILLIGRAM(S): 200 CAPSULE ORAL at 05:03

## 2020-07-22 RX ADMIN — AMLODIPINE BESYLATE 5 MILLIGRAM(S): 2.5 TABLET ORAL at 10:25

## 2020-07-22 RX ADMIN — Medication 650 MILLIGRAM(S): at 01:35

## 2020-07-22 RX ADMIN — PANTOPRAZOLE SODIUM 40 MILLIGRAM(S): 20 TABLET, DELAYED RELEASE ORAL at 05:03

## 2020-07-22 RX ADMIN — Medication 1 TABLET(S): at 12:26

## 2020-07-22 RX ADMIN — Medication 2 SPRAY(S): at 12:29

## 2020-07-22 RX ADMIN — CELECOXIB 200 MILLIGRAM(S): 200 CAPSULE ORAL at 06:03

## 2020-07-22 RX ADMIN — CHLORHEXIDINE GLUCONATE 1 APPLICATION(S): 213 SOLUTION TOPICAL at 05:36

## 2020-07-22 RX ADMIN — Medication 650 MILLIGRAM(S): at 01:02

## 2020-07-22 NOTE — PROGRESS NOTE ADULT - SUBJECTIVE AND OBJECTIVE BOX
Ortho Note    Pt comfortable without complaints, pain controlled  Denies CP, SOB, N/V, numbness/tingling     Vital Signs Last 24 Hrs  T(C): 36.7 (07-22-20 @ 09:25), Max: 36.7 (07-22-20 @ 09:25)  T(F): 98 (07-22-20 @ 09:25), Max: 98 (07-22-20 @ 09:25)  HR: 82 (07-22-20 @ 10:48) (81 - 82)  BP: 151/70 (07-22-20 @ 10:48) (151/70 - 167/75)  BP(mean): --  RR: 18 (07-22-20 @ 09:25) (18 - 18)  SpO2: 98% (07-22-20 @ 10:48) (98% - 99%)  AVSS    focused exam:  General: Pt Alert and oriented, NAD  DSG C/D/I  Pulses: +2DP, WWP feet  Sensation: SILT BLE  Motor: 5/5 EHL/FHL/TA/GS                          9.6    5.30  )-----------( 333      ( 22 Jul 2020 06:13 )             30.7   22 Jul 2020 06:13    133    |  97     |  9      ----------------------------<  101    5.0     |  29     |  0.52     Ca    8.9        22 Jul 2020 06:13  Phos  3.0       22 Jul 2020 06:13  Mg     1.9       22 Jul 2020 06:13        A/P: 84yFemale POD#5 s/p left total hip replacement with acetabular fx intra-op; c/b rapid afib and hypertensive crisis post-op which has now resolved  - Stable  - Pain Control  - afib- well controlled since increased dose of metoprolol 25mg ER bid  - hypertension- increased amlodipine to 10mg PO qd, continue to hold hctz for now; repeat BMP in rehab  - severe protein calorie malnutrition- appreciate nutrition recs; ensure enlive tid, + multivitamin qd  - DVT ppx: ASA  - PT, WBS: TTWB LLE  - R knee ace/wrap brace as needed for stability/ comfort from R knee replacement 33years ago, needing revision after heals from hip replacement  - continue bowel regimen  - OOB for meals as tolerated, I/S  - continue to appreciate medicine and cardiology recs, cleared for d/c to rehab  - dispo: [facility] today    Ortho Pager [phone]

## 2020-07-22 NOTE — PROGRESS NOTE ADULT - ASSESSMENT
84F w h/o AF on eliquis, HTN, RA, R OLIVIA here w progressive L hip pain and difficulty ambulating s/p elective L OLIVIA w Dr. Damon on 7/16 w course c/b AF w RVR and RRT for unresponsiveness on 7/16 to SICU requiring intubation (found to have metabolic acidosis w/ hypercarbia PaCO2 90s), now extubated and transferred to [facility].    #Post-op state. Pain appears controlled. On bowel regimen and IS. PPx: On eliquis and ASA. DISPO: [facility]  #AF - Rate controlled; rate improved. Toprol increased to 25 BID. c/w AC (reduced dose 2.5mg BID as meets criteria for wt and age). TTE obtained this admission showed preserved LVEF w PASP 30mmHg  #HTN - at target. On home regimen w amlodipine. HCTZ Held  #Normocytic anemia w DENISSE. hgb Stable in 9s. Pre-op pt was 11 however 2019 post-op hgb also in 8-9 range.   #Hyponatremia - since resolved  #RA - chronic. takes celebrex    Plan  --HR improved significantly w BP preserved  --Amlodipine increased to 10mg. BP improved.  --Continue holding HCTZ at discharge  --From medical standpoint, patient optimized for disposition to [facility] today  --Outpatient recommendations: would obtain repeat Hgb and iron panel in 4wk    Dispo: Pt for [facility] likely within 24h

## 2020-07-22 NOTE — PROGRESS NOTE ADULT - SUBJECTIVE AND OBJECTIVE BOX
INTERVAL HPI/OVERNIGHT EVENTS: JULIO CESAR O/N    SUBJECTIVE: Patient seen and examined at bedside.   Pt overall feels well. Pain is well controlled. Seated in chair on RA wo chest pain, dyspnea, N/V/abd pain. +BM. No fever, sweats. Denies any headache, changes in vision, numbness.    OBJECTIVE:    VITAL SIGNS:  ICU Vital Signs Last 24 Hrs  T(C): 36.7 (22 Jul 2020 09:25), Max: 36.9 (21 Jul 2020 16:37)  T(F): 98 (22 Jul 2020 09:25), Max: 98.5 (21 Jul 2020 20:28)  HR: 82 (22 Jul 2020 10:48) (75 - 96)  BP: 151/70 (22 Jul 2020 10:48) (145/84 - 178/81)  BP(mean): --  ABP: --  ABP(mean): --  RR: 18 (22 Jul 2020 09:25) (16 - 24)  SpO2: 98% (22 Jul 2020 10:48) (95% - 100%)        07-21 @ 07:01  -  07-22 @ 07:00  --------------------------------------------------------  IN: 1150 mL / OUT: 2200 mL / NET: -1050 mL    07-22 @ 07:01  -  07-22 @ 12:38  --------------------------------------------------------  IN: 350 mL / OUT: 0 mL / NET: 350 mL      CAPILLARY BLOOD GLUCOSE          PHYSICAL EXAM:  GEN: Female in NAD on RA   HEENT: NC/AT, MMM  CV: irregularly irregular, no murmurs, no BLE edema  PULM: nml effort, CTAB  ABD: soft, NABS, non-tender to palpation  NEURO: Alert, moving all extremities. 4/5 in b/l hip and knee flexion. sensation intact. 5/5 in BUE. EOMI  PSYCH: Appropriate, conversant  SKIN: nml    MEDICATIONS:  MEDICATIONS  (STANDING):  apixaban 2.5 milliGRAM(s) Oral every 12 hours  aspirin  chewable 81 milliGRAM(s) Oral daily  celecoxib 200 milliGRAM(s) Oral two times a day  chlorhexidine 2% Cloths 1 Application(s) Topical <User Schedule>  ferrous    sulfate 325 milliGRAM(s) Oral daily  fluticasone propionate 50 MICROgram(s)/spray Nasal Spray 2 Spray(s) Both Nostrils daily  hydrALAZINE Injectable 5 milliGRAM(s) IV Push once  melatonin 3 milliGRAM(s) Oral at bedtime  metoprolol succinate ER 25 milliGRAM(s) Oral two times a day  multivitamin 1 Tablet(s) Oral daily  mupirocin 2% Ointment 1 Application(s) Topical two times a day  pantoprazole  Injectable 40 milliGRAM(s) IV Push every 24 hours  polyethylene glycol 3350 17 Gram(s) Oral daily    MEDICATIONS  (PRN):  acetaminophen   Tablet .. 650 milliGRAM(s) Oral every 6 hours PRN Moderate Pain (4 - 6), Severe Pain (7 - 10)  guaiFENesin  milliGRAM(s) Oral every 12 hours PRN Cough  senna 2 Tablet(s) Oral at bedtime PRN Constipation      ALLERGIES:  Allergies    No Known Allergies    Intolerances        LABS:                        9.6    5.30  )-----------( 333      ( 22 Jul 2020 06:13 )             30.7     07-22    133<L>  |  97  |  9   ----------------------------<  101<H>  5.0   |  29  |  0.52    Ca    8.9      22 Jul 2020 06:13  Phos  3.0     07-22  Mg     1.9     07-22            RADIOLOGY & ADDITIONAL TESTS: Reviewed.

## 2020-07-22 NOTE — DISCHARGE NOTE NURSING/CASE MANAGEMENT/SOCIAL WORK - PATIENT PORTAL LINK FT
You can access the FollowMyHealth Patient Portal offered by [facility] by registering at the following website: [email or web address]. By joining [employer]’s FollowMyHealth portal, you will also be able to view your health information using other applications (apps) compatible with our system.

## 2020-07-22 NOTE — PROGRESS NOTE ADULT - REASON FOR ADMISSION
left hip pain

## 2020-07-27 ENCOUNTER — APPOINTMENT (OUTPATIENT)
Dept: ORTHOPEDIC SURGERY | Facility: CLINIC | Age: 85
End: 2020-07-27

## 2020-07-27 DIAGNOSIS — I16.9 HYPERTENSIVE CRISIS, UNSPECIFIED: ICD-10-CM

## 2020-07-27 DIAGNOSIS — E87.2 ACIDOSIS: ICD-10-CM

## 2020-07-27 DIAGNOSIS — N39.3 STRESS INCONTINENCE (FEMALE) (MALE): ICD-10-CM

## 2020-07-27 DIAGNOSIS — J30.2 OTHER SEASONAL ALLERGIC RHINITIS: ICD-10-CM

## 2020-07-27 DIAGNOSIS — M96.69 FRACTURE OF OTHER BONE FOLLOWING INSERTION OF ORTHOPEDIC IMPLANT, JOINT PROSTHESIS, OR BONE PLATE: ICD-10-CM

## 2020-07-27 DIAGNOSIS — E43 UNSPECIFIED SEVERE PROTEIN-CALORIE MALNUTRITION: ICD-10-CM

## 2020-07-27 DIAGNOSIS — M06.9 RHEUMATOID ARTHRITIS, UNSPECIFIED: ICD-10-CM

## 2020-07-27 DIAGNOSIS — K21.9 GASTRO-ESOPHAGEAL REFLUX DISEASE WITHOUT ESOPHAGITIS: ICD-10-CM

## 2020-07-27 DIAGNOSIS — Z79.01 LONG TERM (CURRENT) USE OF ANTICOAGULANTS: ICD-10-CM

## 2020-07-27 DIAGNOSIS — I95.9 HYPOTENSION, UNSPECIFIED: ICD-10-CM

## 2020-07-27 DIAGNOSIS — E86.1 HYPOVOLEMIA: ICD-10-CM

## 2020-07-27 DIAGNOSIS — M81.0 AGE-RELATED OSTEOPOROSIS WITHOUT CURRENT PATHOLOGICAL FRACTURE: ICD-10-CM

## 2020-07-27 DIAGNOSIS — E87.1 HYPO-OSMOLALITY AND HYPONATREMIA: ICD-10-CM

## 2020-07-27 DIAGNOSIS — Z96.653 PRESENCE OF ARTIFICIAL KNEE JOINT, BILATERAL: ICD-10-CM

## 2020-07-27 DIAGNOSIS — N28.9 DISORDER OF KIDNEY AND URETER, UNSPECIFIED: ICD-10-CM

## 2020-07-27 DIAGNOSIS — M16.12 UNILATERAL PRIMARY OSTEOARTHRITIS, LEFT HIP: ICD-10-CM

## 2020-07-27 DIAGNOSIS — H57.9 UNSPECIFIED DISORDER OF EYE AND ADNEXA: ICD-10-CM

## 2020-07-27 DIAGNOSIS — I48.91 UNSPECIFIED ATRIAL FIBRILLATION: ICD-10-CM

## 2020-07-27 DIAGNOSIS — D50.9 IRON DEFICIENCY ANEMIA, UNSPECIFIED: ICD-10-CM

## 2020-07-27 DIAGNOSIS — G47.33 OBSTRUCTIVE SLEEP APNEA (ADULT) (PEDIATRIC): ICD-10-CM

## 2020-07-29 ENCOUNTER — APPOINTMENT (OUTPATIENT)
Dept: ORTHOPEDIC SURGERY | Facility: CLINIC | Age: 85
End: 2020-07-29
Payer: MEDICARE

## 2020-07-29 DIAGNOSIS — Z47.1 AFTERCARE FOLLOWING JOINT REPLACEMENT SURGERY: ICD-10-CM

## 2020-07-29 DIAGNOSIS — Z96.653 AFTERCARE FOLLOWING JOINT REPLACEMENT SURGERY: ICD-10-CM

## 2020-07-29 PROCEDURE — 99024 POSTOP FOLLOW-UP VISIT: CPT

## 2020-07-29 RX ORDER — FERROUS SULFATE TAB EC 324 MG (65 MG FE EQUIVALENT) 324 (65 FE) MG
324 (65 FE) TABLET DELAYED RESPONSE ORAL
Qty: 90 | Refills: 0 | Status: ACTIVE | COMMUNITY
Start: 2019-08-18

## 2020-07-29 RX ORDER — ACETAMINOPHEN AND CODEINE 300; 30 MG/1; MG/1
300-30 TABLET ORAL
Qty: 120 | Refills: 0 | Status: ACTIVE | COMMUNITY
Start: 2020-06-24

## 2020-07-29 RX ORDER — METOPROLOL SUCCINATE 25 MG/1
25 TABLET, EXTENDED RELEASE ORAL
Qty: 90 | Refills: 0 | Status: ACTIVE | COMMUNITY
Start: 2019-09-15

## 2020-07-29 RX ORDER — PANTOPRAZOLE 20 MG/1
20 TABLET, DELAYED RELEASE ORAL
Qty: 30 | Refills: 0 | Status: ACTIVE | COMMUNITY
Start: 2020-06-27

## 2020-07-29 RX ORDER — BENZONATATE 200 MG/1
200 CAPSULE ORAL
Qty: 30 | Refills: 0 | Status: ACTIVE | COMMUNITY
Start: 2020-03-31

## 2020-07-29 RX ORDER — AZITHROMYCIN 250 MG/1
250 TABLET, FILM COATED ORAL
Qty: 6 | Refills: 0 | Status: ACTIVE | COMMUNITY
Start: 2020-04-01

## 2020-07-29 RX ORDER — CLOTRIMAZOLE AND BETAMETHASONE DIPROPIONATE 10; .5 MG/G; MG/G
1-0.05 CREAM TOPICAL
Qty: 45 | Refills: 0 | Status: ACTIVE | COMMUNITY
Start: 2020-05-18

## 2020-07-31 NOTE — DISCUSSION/SUMMARY
[de-identified] : A\par 2 wk s/p L OLIVIA\par \par P\par Continue treatemt fo felon per doc\par Continue PT\par Dressing removed okay to shower\par OTC analgesia PRN pain\par RTO 4 weeks\par \par \par All medical record entries made by the PA/Scribe/Fellow are at my, Dr. Rojas Damon's direction and personally dictated by me on 07/29/2020]. I have reviewed the chart and agree that the record accurately reflects my personal performance of the history, physical exam, assessment, and plan. I have also personally directed reviewed, and agreed with the chart.\par

## 2020-07-31 NOTE — PHYSICAL EXAM
[de-identified] : L hip ncision healing well without drainage or erythema. Mild lower extremity edema. DNVI. \par \par L finger with resolinv acral swelling mildly TTP

## 2020-08-25 ENCOUNTER — APPOINTMENT (OUTPATIENT)
Dept: ORTHOPEDIC SURGERY | Facility: CLINIC | Age: 85
End: 2020-08-25
Payer: MEDICARE

## 2020-08-25 VITALS — TEMPERATURE: 98.2 F

## 2020-08-25 PROCEDURE — 99024 POSTOP FOLLOW-UP VISIT: CPT

## 2020-08-27 NOTE — HISTORY OF PRESENT ILLNESS
[de-identified] : 83 y/o F, s/p 5 weeks out of left THR on 07/16/20, here for 2nd post operative visit. Pt states her left hip is doing great and offers no significant complaints.

## 2020-08-27 NOTE — PHYSICAL EXAM
[de-identified] : General: Not in acute distress, dressed appropriately, sitting on examination table\par Skin: Warm and dry, normal turgor, no rashes\par Neurological: AOx3, Cranial nerves grossly in tact\par Psych: Mood and affect appropriate\par \par Left Hip: Well healed surgical incision. No swelling edema erythema redness or drainage. Nontender.. ROM: Not assessed. 5/5 strength. Normal gait. Ambulates with a wheeled walker. \par \par  [de-identified] : No imaging today.\par

## 2020-08-27 NOTE — ASSESSMENT
[FreeTextEntry1] : Assessment\par S/P 5 weeks out of left OLIVIA\par \par Plan\par Will start outpatient physical therapy 3x a week. \par F/U in 1 month \par \par All medical record entries made by the PA/Scribe/Fellow are at my, Dr. Rojas Damon's direction and personally dictated by me on 08/25/2020]. I have reviewed the chart and agree that the record accurately reflects my personal performance of the history, physical exam, assessment, and plan. I have also personally directed reviewed, and agreed with the chart.\par

## 2021-04-07 ENCOUNTER — APPOINTMENT (OUTPATIENT)
Dept: ORTHOPEDIC SURGERY | Facility: CLINIC | Age: 86
End: 2021-04-07
Payer: MEDICARE

## 2021-04-07 ENCOUNTER — RESULT REVIEW (OUTPATIENT)
Age: 86
End: 2021-04-07

## 2021-04-07 ENCOUNTER — OUTPATIENT (OUTPATIENT)
Dept: OUTPATIENT SERVICES | Facility: HOSPITAL | Age: 86
LOS: 1 days | End: 2021-04-07
Payer: MEDICARE

## 2021-04-07 VITALS — TEMPERATURE: 97.8 F

## 2021-04-07 DIAGNOSIS — Z90.49 ACQUIRED ABSENCE OF OTHER SPECIFIED PARTS OF DIGESTIVE TRACT: Chronic | ICD-10-CM

## 2021-04-07 DIAGNOSIS — Z98.890 OTHER SPECIFIED POSTPROCEDURAL STATES: Chronic | ICD-10-CM

## 2021-04-07 DIAGNOSIS — Z96.653 PRESENCE OF ARTIFICIAL KNEE JOINT, BILATERAL: Chronic | ICD-10-CM

## 2021-04-07 PROCEDURE — 73502 X-RAY EXAM HIP UNI 2-3 VIEWS: CPT

## 2021-04-07 PROCEDURE — 99072 ADDL SUPL MATRL&STAF TM PHE: CPT

## 2021-04-07 PROCEDURE — 99212 OFFICE O/P EST SF 10 MIN: CPT

## 2021-04-07 PROCEDURE — 73502 X-RAY EXAM HIP UNI 2-3 VIEWS: CPT | Mod: 26,LT

## 2021-04-10 NOTE — PHYSICAL EXAM
[de-identified] : General: Not in acute distress, dressed appropriately, sitting on examination table\par Skin: Warm and dry, normal turgor, no rashes\par Neurological: AOx3, Cranial nerves grossly in tact\par Psych: Mood and affect appropriate\par \par Left Hip: Well healed surgical incision. No swelling edema erythema redness or drainage. Nontender.. ROM: Not assessed. 5/5 strength. Normal gait. Ambulates in wheelchair.\par  [de-identified] : Bilateral hip XR reviewed and shows well placed OLIVIA. No signs of loosening or fracture.

## 2021-04-10 NOTE — ASSESSMENT
[FreeTextEntry1] : Assessment\par s/p left OLIVIA \par \par Plan\par Restart home therapy \par \par Follow up 3 months. \par All medical record entries made by the PA/Loren/Fellow are at my, Dr. Rojas Damon's direction and personally dictated by me on 04/07/2021]. I have reviewed the chart and agree that the record accurately reflects my personal performance of the history, physical exam, assessment, and plan. I have also personally directed reviewed, and agreed with the chart.\par \par

## 2021-04-10 NOTE — END OF VISIT
[FreeTextEntry3] : By signing my name below, I, Yumiko Espinoza, attest that this documentation has been prepared under the direction and in the presence of Star Macdonald PA-C.

## 2021-04-10 NOTE — REVIEW OF SYSTEMS
[Negative] : Heme/Lymph [Arthralgia] : no arthralgia [Joint Pain] : no joint pain [Joint Swelling] : no joint swelling [Joint Stiffness] : no joint stiffness

## 2021-04-10 NOTE — HISTORY OF PRESENT ILLNESS
[de-identified] : 85 year old female s/p left OLIVIA 07/16/20 was doing well overall. She did have an event a few weeks ago where she lost her balance while trying to sit on the toilet and landed hard on it. Since then she has developed some pain with activity. Currently ambulating in wheelchair.

## 2021-05-03 ENCOUNTER — APPOINTMENT (OUTPATIENT)
Dept: ORTHOPEDIC SURGERY | Facility: CLINIC | Age: 86
End: 2021-05-03
Payer: MEDICARE

## 2021-05-10 ENCOUNTER — APPOINTMENT (OUTPATIENT)
Dept: ORTHOPEDIC SURGERY | Facility: CLINIC | Age: 86
End: 2021-05-10
Payer: MEDICARE

## 2021-05-10 PROCEDURE — 99213 OFFICE O/P EST LOW 20 MIN: CPT

## 2021-05-10 PROCEDURE — 99072 ADDL SUPL MATRL&STAF TM PHE: CPT

## 2021-05-10 PROCEDURE — 73562 X-RAY EXAM OF KNEE 3: CPT | Mod: 50

## 2021-05-10 RX ORDER — AMOXICILLIN AND CLAVULANATE POTASSIUM 200; 28.5 MG/1; MG/1
200-28.5 TABLET, CHEWABLE ORAL
Qty: 10 | Refills: 0 | Status: ACTIVE | COMMUNITY
Start: 2020-11-24

## 2021-05-10 RX ORDER — FLUCONAZOLE 100 MG/1
100 TABLET ORAL
Qty: 6 | Refills: 0 | Status: ACTIVE | COMMUNITY
Start: 2020-11-20

## 2021-05-10 RX ORDER — OMEPRAZOLE 20 MG/1
20 CAPSULE, DELAYED RELEASE ORAL
Qty: 90 | Refills: 0 | Status: ACTIVE | COMMUNITY
Start: 2020-11-09

## 2021-05-10 RX ORDER — TERCONAZOLE 4 MG/G
0.4 CREAM VAGINAL
Qty: 45 | Refills: 0 | Status: ACTIVE | COMMUNITY
Start: 2020-11-13

## 2021-05-10 RX ORDER — NITROFURANTOIN (MONOHYDRATE/MACROCRYSTALS) 25; 75 MG/1; MG/1
100 CAPSULE ORAL
Qty: 14 | Refills: 0 | Status: ACTIVE | COMMUNITY
Start: 2020-11-20

## 2021-05-10 NOTE — DISCUSSION/SUMMARY
[de-identified] : Discussed at length the nature of the patients condition. Her bilateral TKR, right tkr and left revision tkr. While there is some evidence of radiolucency in the right knee I do not see gross change of position or gross loosening of the implant. I do not think any surgery is warranted. I reviewed xray films with her. Her left revsion tkr also seems to be functioning well. She might have some loosening of the patella competent but again no change form prior films.  I do not think any surgery is warranted. Due to her age and comorbidities she does have rheumatoid arthritis.  I did suggest PT, exercise program, activities as tolerated, and she can follow up in 1 year.

## 2021-05-10 NOTE — ADDENDUM
[FreeTextEntry1] : This note was written by Feliz Cantu on 05/10/2021 acting scribe for Dr. John Arreguin M.D.\par \par I Dr. John Arreguin have read and attest that all the information, medical decision making, and discharge instructions within are true and accurate.

## 2021-05-10 NOTE — PHYSICAL EXAM
[de-identified] : Left Knee\par Inspection: no effusion\par Wounds: healed midline incision\par Alignment: normal.\par Palpation: no specific tenderness on palpation.\par ROM: Active (in degrees) 0-90 no instability and good strength \par Ligamentous laxity (neg): negative ant. drawer test, negative post. drawer test, stable to varus stress test, stable to valgus stress test,\par Patellofemoral Alignment Test: Q angle-, normal.\par Muscle Test: good quad strength.\par Leg examination: calf is soft and non-tender.\par \par Right Knee\par Inspection: trace effusion, popiteal cyst noted \par Wounds: healed midline incision\par Alignment: normal.\par Palpation: no specific tenderness on palpation.\par ROM: Active (in degrees) 0-100 no instability and good strength \par Ligamentous laxity (neg): negative ant. drawer test, negative post. drawer test, stable to varus stress test, stable to valgus stress test,\par Patellofemoral Alignment Test: Q angle-, normal.\par Muscle Test: good quad strength.\par Leg examination: calf is soft and non-tender.  [de-identified] : Xray films compared to 2/22/2018: No changes noted from prior films.\par \par Left  knee xray, 3 views AP, Lateral, Merchant view taken at the office today demonstrates a revision total knee replacement with satisfactory position and alignment\par \par Right knee xray, 3 views AP, Lateral, Merchant view taken at the office today demonstrates a total knee replacement with good position, screw noted in the  proximal medial tibia, patella at appropriate height, patella at central position with what appears to be osteolysis of the medial and lateral facet, possible fracture of the lateral facet of the patella \par \par

## 2021-05-10 NOTE — HISTORY OF PRESENT ILLNESS
[de-identified] : 85 year old female presents for evaluation of bilateral TKA done in 1986 with a subsequent left revision TKR in 2010 by Dr. Fernandes. The patient recently also had a right total hip replacement by Dr. Damon in May 2019 and July of last year she had the left hip done. She states that she is not a good candidate for the total knee replacement due to severe medical issues. She states that last surgery she had with Dr. Damon she was intubated after the procedure. The patient walks with a walker and is aware her right knee is loose and would like to continue with a nonoperative approach.

## 2022-04-11 ENCOUNTER — APPOINTMENT (OUTPATIENT)
Dept: ORTHOPEDIC SURGERY | Facility: CLINIC | Age: 87
End: 2022-04-11
Payer: MEDICARE

## 2022-04-11 PROCEDURE — 73562 X-RAY EXAM OF KNEE 3: CPT | Mod: 50

## 2022-04-11 PROCEDURE — 99213 OFFICE O/P EST LOW 20 MIN: CPT

## 2022-04-11 RX ORDER — AMLODIPINE BESYLATE 5 MG/1
5 TABLET ORAL
Qty: 90 | Refills: 0 | Status: ACTIVE | COMMUNITY
Start: 2022-02-06

## 2022-04-11 RX ORDER — LOSARTAN POTASSIUM 25 MG/1
25 TABLET, FILM COATED ORAL
Qty: 90 | Refills: 0 | Status: ACTIVE | COMMUNITY
Start: 2022-03-18

## 2022-04-11 RX ORDER — PANTOPRAZOLE 40 MG/1
40 TABLET, DELAYED RELEASE ORAL
Qty: 90 | Refills: 0 | Status: ACTIVE | COMMUNITY
Start: 2022-01-28

## 2022-04-11 RX ORDER — AMOXICILLIN AND CLAVULANATE POTASSIUM 500; 125 MG/1; MG/1
500-125 TABLET, FILM COATED ORAL
Qty: 20 | Refills: 0 | Status: ACTIVE | COMMUNITY
Start: 2022-01-13

## 2022-04-11 RX ORDER — VALACYCLOVIR 500 MG/1
500 TABLET, FILM COATED ORAL
Qty: 36 | Refills: 0 | Status: ACTIVE | COMMUNITY
Start: 2022-04-06

## 2022-04-11 RX ORDER — CLOBETASOL PROPIONATE 0.5 MG/ML
0.05 SOLUTION TOPICAL
Qty: 50 | Refills: 0 | Status: ACTIVE | COMMUNITY
Start: 2021-08-16

## 2022-04-11 RX ORDER — FLUOCINOLONE ACETONIDE 0.11 MG/ML
0.01 OIL TOPICAL
Qty: 118 | Refills: 0 | Status: ACTIVE | COMMUNITY
Start: 2021-08-04

## 2022-04-19 NOTE — END OF VISIT
[FreeTextEntry3] : This note was written by Karolina Troncoso on 4/11/22 acting as scribe for Dr. John Arreguin M.D.\par \par I, Dr. John Arreguin, have read and attest that all the information, medical decision making and discharge instructions within are true and accurate.

## 2022-04-19 NOTE — DISCUSSION/SUMMARY
[de-identified] : The patient's bilateral TKRs still appear to be functioning well with no mechanical issues at this time. She may have some loosening of the patellar component. I recommend she continue PT, use walker for ambulation. They can continue activities as tolerated. I suggest she see her cardiologist for her lower leg edema. See back in 1 year.

## 2022-04-19 NOTE — PHYSICAL EXAM
[de-identified] : RIGHT Knee\par Inspection: no effusion or erythema.\par Wounds: none. \par Alignment: normal.\par Palpation: no specific tenderness on palpation.\par ROM: Active (in degrees): 0-95.\par Ligamentous laxity (neg): all ligaments appear stable, negative ant. drawer test, negative post. drawer test, stable to varus stress test, stable to valgus stress test, negative Lachman's test, negative pivot shift test.\par Meniscal test: negative Chandrika's, negative Elizabeth.\par Patellofemoral Alignment Test: Q-angle-, normal.\par Muscle Test: good quad strength.\par Leg examination: calf is soft and non-tender.\par \par LEFT Knee\par Inspection: no effusion or erythema.\par Wounds: healed midline incision. \par Alignment: normal.\par Palpation: no specific tenderness on palpation.\par ROM: Active (in degrees): 0-90. \par Ligamentous laxity (neg): all ligaments appear stable, negative ant. drawer test, negative post. drawer test, stable to varus stress test, stable to valgus stress test, negative Lachman's test, negative pivot shift test.\par Meniscal test: negative Chandrika's, negative Elizabeth.\par Patellofemoral Alignment Test: Q-angle-, normal.\par Muscle Test: good quad strength.\par Leg examination: calf is soft and non-tender. [de-identified] : Left knee 3 view taken in the office today, AP lateral and Merchant show a revision TKA in good position and alignment, not loosening, patella at appropriate height and central position.\par \par Right 3 view taken in the office today, AP lateral and Merchant show a TKA in good position and alignment, not loosening, patella at appropriate height and central position, screw in the proximal tibia.\par \par

## 2022-04-19 NOTE — HISTORY OF PRESENT ILLNESS
[de-identified] : 86 year old female presents for follow-up evaluation s/p a bilateral TKR in 1986 and left knee revision in 2010. The pt did have right knee radiolucency with no gross changes of positioning or loosening of the implant and loosening of the left patella. No change from prior films. She does home therapy 2x per week. She usually uses a walker for ambulation but arrived in a wheelchair. She also reports a change in her water pill, as she changed from hydrochlorothiazide to losartan with potassium. The pt has left knee swelling and pain since the change. She also takes Celebrex prn. Pt will follow up with her cardiologist in the future to discuss her medication change.

## 2022-07-15 ENCOUNTER — EMERGENCY (EMERGENCY)
Facility: HOSPITAL | Age: 87
LOS: 1 days | Discharge: HOME | End: 2022-07-15
Admitting: EMERGENCY MEDICINE
Payer: MEDICARE

## 2022-07-15 VITALS
OXYGEN SATURATION: 97 % | HEART RATE: 85 BPM | RESPIRATION RATE: 18 BRPM | TEMPERATURE: 98 F | HEIGHT: 65 IN | WEIGHT: 110.01 LBS | SYSTOLIC BLOOD PRESSURE: 134 MMHG | DIASTOLIC BLOOD PRESSURE: 72 MMHG

## 2022-07-15 DIAGNOSIS — Z90.49 ACQUIRED ABSENCE OF OTHER SPECIFIED PARTS OF DIGESTIVE TRACT: Chronic | ICD-10-CM

## 2022-07-15 DIAGNOSIS — Z98.890 OTHER SPECIFIED POSTPROCEDURAL STATES: Chronic | ICD-10-CM

## 2022-07-15 DIAGNOSIS — Z96.653 PRESENCE OF ARTIFICIAL KNEE JOINT, BILATERAL: Chronic | ICD-10-CM

## 2022-07-15 PROCEDURE — 73562 X-RAY EXAM OF KNEE 3: CPT

## 2022-07-15 PROCEDURE — 99284 EMERGENCY DEPT VISIT MOD MDM: CPT | Mod: 25

## 2022-07-15 PROCEDURE — 73562 X-RAY EXAM OF KNEE 3: CPT | Mod: 26,LT

## 2022-07-15 PROCEDURE — 73552 X-RAY EXAM OF FEMUR 2/>: CPT | Mod: 26,LT

## 2022-07-15 PROCEDURE — 73552 X-RAY EXAM OF FEMUR 2/>: CPT

## 2022-07-15 PROCEDURE — 73502 X-RAY EXAM HIP UNI 2-3 VIEWS: CPT

## 2022-07-15 PROCEDURE — 73502 X-RAY EXAM HIP UNI 2-3 VIEWS: CPT | Mod: 26,LT

## 2022-07-15 RX ORDER — ACETAMINOPHEN 500 MG
975 TABLET ORAL ONCE
Refills: 0 | Status: COMPLETED | OUTPATIENT
Start: 2022-07-15 | End: 2022-07-15

## 2022-07-15 RX ADMIN — Medication 975 MILLIGRAM(S): at 13:47

## 2022-07-15 NOTE — ED PROVIDER NOTE - CLINICAL SUMMARY MEDICAL DECISION MAKING FREE TEXT BOX
85 y/o female w/ hx RA, OA, HTN, AF on eliquis, b/l THR, L knee TKR p/w constant, throbbing L knee pain radiating to L hip x approx 1 wk after HHA lifted pt's leg into bed.  No direct trauma/ injury.  Pt's ROM is intact, no focal ttp.  No erythema, swelling.  Pt ambulates with walker at baseline, is able to stand/ ambulate in ED with assistance.    Will obtain XR L hip/pelvis, femur, and knee  Provide pain control  Pt has orthopedist she can f/u with, advised if XRs are neg, she will require close f/u with 87 y/o female w/ hx RA, OA, HTN, AF on eliquis, b/l THR, L knee TKR p/w constant, throbbing L knee pain radiating to L hip x approx 1 wk after HHA lifted pt's leg into bed.  No direct trauma/ injury.  Pt's ROM is intact, no focal ttp.  No erythema, swelling.  Pt ambulates with walker at baseline, is able to stand/ ambulate in ED with assistance.    Will obtain XR L hip/pelvis, femur, and knee to screen for fx/ hardware dislocation, though suspicion relatively low based on reassuring exam  Provide pain control  Pt has orthopedist she can f/u with, advised if XRs are neg, she will require close f/u with

## 2022-07-15 NOTE — ED PROVIDER NOTE - OBJECTIVE STATEMENT
87 y/o female w/ hx RA, OA, HTN, AF on eliquis, b/l THR, L knee TKR p/w constant, throbbing L knee pain radiating to L hip x approx 1 wk, which started after pt's HHA lifted her L left leg and attempted to put her into bed.  Denies direct trauma/ injury.  Denies numbness, tingling, weakness, or swelling to leg.  Tried tylenol with some relief.  Tried calling her orthopedist who was unavailable to see her.  Has HHA 7 hrs/ day and sister helps her at night.  Walks with walker at baseline.  Can walk, but with difficulty.  No other complaints, denies f/c, redness to ext. 87 y/o female w/ hx RA, OA, HTN, AF on eliquis, b/l THR, L knee TKR p/w constant, throbbing L knee pain radiating to L hip x approx 1 wk, which started after pt's HHA lifted her L left leg and attempted to put her into bed approx 1 wk ago.  Denies direct trauma/ injury.  Denies numbness, tingling, weakness, or swelling to leg.  Tried tylenol with some relief.  Tried calling her orthopedist who was unavailable to see her.  Has HHA 7 hrs/ day and sister helps her at night.  Walks with walker at baseline.  Can walk, but with difficulty.  No other complaints, denies f/c, redness to ext. 85 y/o female w/ hx RA, OA, HTN, AF on eliquis, b/l THR, L knee TKR p/w constant, throbbing L knee pain radiating to L hip x approx 1 wk, which started after pt's HHA lifted her L left leg and attempted to put her into bed approx 1 wk ago.  Denies direct trauma/ injury.  Denies numbness, tingling, weakness, redness, or swelling to leg.  Tried tylenol with some relief.  Tried calling her orthopedist who was unavailable to see her.  Has HHA 7 hrs/ day and sister helps her at night.  Walks with walker at baseline.  Can walk, but with difficulty.  No other complaints, denies f/c, redness to ext. 87 y/o female w/ hx RA, OA, HTN, AF on eliquis, b/l THR, L knee TKR p/w constant, throbbing L knee pain radiating to L hip x approx 1 wk, which started after pt's HHA lifted her L left leg and attempted to put her into bed approx 1 wk ago.  Denies direct trauma/ injury.  Denies numbness, tingling, weakness, redness, or swelling to leg.  Tried tylenol with some relief.  Tried calling her orthopedist who was unavailable to see her.  Has HHA 7 hrs/ day and sister helps her at night.  Walks with walker at baseline.  Can walk, but with difficulty.  No other complaints, denies f/c.

## 2022-07-15 NOTE — ED PROVIDER NOTE - PATIENT PORTAL LINK FT
You can access the FollowMyHealth Patient Portal offered by [facility] by registering at the following website: [email or web address]. By joining [employer]’s FollowMyHealth portal, you will also be able to view your health information using other applications (apps) compatible with our system.

## 2022-07-15 NOTE — ED ADULT TRIAGE NOTE - CHIEF COMPLAINT QUOTE
Pt presents to ED c/o L knee pain x 1 week, sent by PCP for knee xray. Denies fall, trauma/injury. Pt arrives in wheelchair, states she can walk but with increase in pain.

## 2022-07-15 NOTE — ED PROVIDER NOTE - PROGRESS NOTE DETAILS
Xrays done, discussed with radiology @1:48pm, no obvious fracture/ hardware abnormality  Discussed results with pt  Has orthopedist she can f/u with  Discussed pain control, will DC with pmd and ortho f/u

## 2022-07-15 NOTE — ED PROVIDER NOTE - NSFOLLOWUPINSTRUCTIONS_ED_ALL_ED_FT
Thank you for visiting [facility] Emergency Department.      We saw you today for left knee pain and hip pain.  Your xrays did not show any obvious fracture or dislocation.    Please follow up with your orthopedist in 1 week for re-evaluation.  Please know that no emergency visit is complete without follow-up with your primary care provider in 1 week.    You may take ibuprofen 600 mg every 6 hours as needed for pain.  Please take with food.  Stop taking if you develop abdominal pain, dark/ bloody stools.  You may also take tylenol 650-1000 mg every 6 hours as needed for pain.  Do not exceed 4000 mg in 1 day.    Return to ER for fevers, chills, chest pain, shortness of breath, worsening pain, numbness/tingling/weakness to extremities, and/or any concerning symptoms.

## 2022-07-15 NOTE — ED PROVIDER NOTE - PHYSICAL EXAMINATION
MSK: + RA deformities to b/l hands, feet.  +old healed incisions to L knee, b/l hips.  No bony ttp to b/l hips, thighs, or knees.  Range of motion is not limited, no muscle or joint tenderness.  All soft tissue compartments palpated, normal, nontender without tension.  Bilateral DP/PT pulses are normal 2+, and symmetrical.  No lower extremity edema or calf tenderness appreciated.    Able to stand with assistance, can walk with assistance

## 2022-07-15 NOTE — ED ADULT NURSE NOTE - NSIMPLEMENTINTERV_GEN_ALL_ED
Implemented All Fall with Harm Risk Interventions:  Susan to call system. Call bell, personal items and telephone within reach. Instruct patient to call for assistance. Room bathroom lighting operational. Non-slip footwear when patient is off stretcher. Physically safe environment: no spills, clutter or unnecessary equipment. Stretcher in lowest position, wheels locked, appropriate side rails in place. Provide visual cue, wrist band, yellow gown, etc. Monitor gait and stability. Monitor for mental status changes and reorient to person, place, and time. Review medications for side effects contributing to fall risk. Reinforce activity limits and safety measures with patient and family. Provide visual clues: red socks.

## 2022-07-15 NOTE — ED ADULT NURSE NOTE - OBJECTIVE STATEMENT
Pt with pmx of RA, total hip replacement, bilateral knee joint replacement surgery presented to ED with c/o of increasing pain in left knee for aprox 1 week. AOX4. VSS. Patient oriented to ED area. All needs attended. Rounding in progress. Fall risk precautions maintained.

## 2022-07-15 NOTE — HISTORY OF PRESENT ILLNESS
[de-identified] : Following up after right hip replacement on May 16, 2019, is currently residing in a subacute nursing facility. She is working with physical therapy daily and is ambulating. No complaints at this time. amar

## 2022-07-18 DIAGNOSIS — M25.562 PAIN IN LEFT KNEE: ICD-10-CM

## 2022-07-18 DIAGNOSIS — Z96.643 PRESENCE OF ARTIFICIAL HIP JOINT, BILATERAL: ICD-10-CM
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
## 2022-07-18 DIAGNOSIS — I48.91 UNSPECIFIED ATRIAL FIBRILLATION: ICD-10-CM

## 2022-07-18 DIAGNOSIS — M46.1 SACROILIITIS, NOT ELSEWHERE CLASSIFIED: ICD-10-CM

## 2022-07-18 DIAGNOSIS — I10 ESSENTIAL (PRIMARY) HYPERTENSION: ICD-10-CM
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
## 2022-07-18 DIAGNOSIS — Z79.82 LONG TERM (CURRENT) USE OF ASPIRIN: ICD-10-CM

## 2022-07-18 DIAGNOSIS — Z79.01 LONG TERM (CURRENT) USE OF ANTICOAGULANTS: ICD-10-CM

## 2022-07-18 DIAGNOSIS — M06.9 RHEUMATOID ARTHRITIS, UNSPECIFIED: ICD-10-CM

## 2022-07-18 DIAGNOSIS — M25.552 PAIN IN LEFT HIP: ICD-10-CM

## 2022-10-03 ENCOUNTER — APPOINTMENT (OUTPATIENT)
Dept: ORTHOPEDIC SURGERY | Facility: CLINIC | Age: 87
End: 2022-10-03

## 2022-10-03 VITALS — WEIGHT: 120 LBS | HEIGHT: 65 IN | BODY MASS INDEX: 19.99 KG/M2

## 2022-10-03 DIAGNOSIS — M17.0 BILATERAL PRIMARY OSTEOARTHRITIS OF KNEE: ICD-10-CM

## 2022-10-03 DIAGNOSIS — M06.9 RHEUMATOID ARTHRITIS, UNSPECIFIED: ICD-10-CM

## 2022-10-03 PROCEDURE — 73562 X-RAY EXAM OF KNEE 3: CPT | Mod: 50

## 2022-10-03 PROCEDURE — 99213 OFFICE O/P EST LOW 20 MIN: CPT

## 2022-10-03 NOTE — HISTORY OF PRESENT ILLNESS
[de-identified] : 86 year old female presents for follow-up evaluation s/p a bilateral TKR in 1986 and left knee revision in 2010. Patient reports no changes with her symptoms or activities level since the last visit. She does home therapy 2 x per week. She usually uses a walker for ambulation but arrived in a wheelchair. She reports that she does not have any longer the lower legs swelling. She also takes Celebrex daily for her rheumatoid arthritis and Tylenol PRN. Pt is following up with her cardiologist.\par  \par

## 2022-10-03 NOTE — DISCUSSION/SUMMARY
[de-identified] : Bilatral TKA, rheumatoid arthritis\par At this time she has no complaints or change in symptoms, no change in radiographs and I reviewed with her\par She will continue Celebrex for her RA, Tylenol , PT& OT and walker for ambulation\par I will see her back in 1 year\par Sooner if any acute problems\par All questions asked and answered

## 2022-10-03 NOTE — PHYSICAL EXAM
[de-identified] : RIGHT Knee\par Inspection: no effusion or erythema.\par Wounds: none. \par Alignment: normal.\par Palpation: no specific tenderness on palpation.\par ROM: Active (in degrees): 0-90.\par Ligamentous laxity (neg): all ligaments appear stable, negative ant. drawer test, negative post. drawer test, stable to varus stress test, stable to valgus stress test, negative Lachman's test, negative pivot shift test.\par Meniscal test: negative Chandrika's, negative Elizabeth.\par Patellofemoral Alignment Test: Q-angle-, normal.\par Muscle Test: good quad strength.\par Leg examination: calf is soft and non-tender.\par \par LEFT Knee\par Inspection: no effusion or erythema.\par Wounds: healed midline incision. \par Alignment: normal.\par Palpation: no specific tenderness on palpation.\par ROM: Active (in degrees): 0-90. \par Ligamentous laxity (neg): all ligaments appear stable, negative ant. drawer test, negative post. drawer test, stable to varus stress test, stable to valgus stress test, negative Lachman's test, negative pivot shift test.\par Meniscal test: negative Chandrika's, negative Elizabeth.\par Patellofemoral Alignment Test: Q-angle-, normal.\par Muscle Test: good quad strength.\par Leg examination: calf is soft and non-tender. \par \par  [de-identified] :  Left knee 3 view taken in the office today, AP lateral and Merchant show a revision TKA with stems and tibial augment, femoral cerclage wires,  in good position and alignment, no loosening, patella at appropriate height and central position. 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 \par \par  \par

## 2023-01-25 NOTE — PROVIDER CONTACT NOTE (OTHER) - NAME OF MD/NP/PA/DO NOTIFIED:
January 25, 2023      [facility]  [address]  [place] [address]  Phone: [phone]       Patient: Makenzie Pizarro   YOB: 1959  Date of Visit: 01/25/2023    To Whom It May Concern:    NOHEMI Pizarro  was at [facility] on 01/25/2023. The patient may return to work/school on 1/28/2022 pending appointment on 1/27/2022. If you have any questions or concerns, or if I can be of further assistance, please do not hesitate to contact me.    Sincerely,    Zach Mary PA-C      Hossein Li MD Ortho

## 2023-05-07 VITALS — BODY MASS INDEX: 19.99 KG/M2 | HEIGHT: 65 IN | WEIGHT: 120 LBS

## 2023-05-08 ENCOUNTER — APPOINTMENT (OUTPATIENT)
Dept: ORTHOPEDIC SURGERY | Facility: CLINIC | Age: 88
End: 2023-05-08
Payer: MEDICARE

## 2023-05-08 DIAGNOSIS — Z96.653 PRESENCE OF ARTIFICIAL KNEE JOINT, BILATERAL: ICD-10-CM

## 2023-05-08 PROCEDURE — 99213 OFFICE O/P EST LOW 20 MIN: CPT

## 2023-05-08 PROCEDURE — 73562 X-RAY EXAM OF KNEE 3: CPT | Mod: 50

## 2023-05-08 NOTE — DISCUSSION/SUMMARY
[de-identified] : Patient is following on her bilateral TKA with h/o  rheumatoid arthritis. At this time she has no complaints or change in symptoms, no change in radiographs and I reviewed with her.\par She will continue Celebrex for her RA, Tylenol , PT& OT and walker for ambulation. Will see her back in 6 months or  sooner if any acute problems. All questions asked and answered. \par  \par

## 2023-05-08 NOTE — PHYSICAL EXAM
[de-identified] : RIGHT Knee\par Inspection: no effusion or erythema.\par Wounds: healed midline incision\par Alignment: normal.\par Palpation: no specific tenderness on palpation.\par ROM: Active (in degrees): 0-90.\par Ligamentous laxity (neg): all ligaments appear stable, negative ant. drawer test, negative post. drawer test, stable to varus stress test, stable to valgus stress test, negative Lachman's test, negative pivot shift test.\par Meniscal test: negative Chandrika's, negative Elizabeth.\par Patellofemoral Alignment Test: Q-angle-, normal.\par Muscle Test: good quad strength.\par Leg examination: calf is soft and non-tender.\par \par LEFT Knee\par Inspection: no effusion or erythema.\par Wounds: healed midline incision. \par Alignment: normal.\par Palpation: no specific tenderness on palpation.\par ROM: Active (in degrees): 0-90. \par Ligamentous laxity (neg): all ligaments appear stable, negative ant. drawer test, negative post. drawer test, stable to varus stress test, stable to valgus stress test, negative Lachman's test, negative pivot shift test.\par Meniscal test: negative Chandrika's, negative Elizabeth.\par Patellofemoral Alignment Test: Q-angle-, normal.\par Muscle Test: good quad strength.\par Leg examination: calf is soft and non-tender. \par \par \par  [de-identified] : Left knee 3 view taken in the office today, AP lateral and Merchant show a revision TKA with stems and tibial augment, femoral cerclage wires, in good position and alignment, no loosening, patella at appropriate height and central position. 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 \par \par

## 2023-05-08 NOTE — HISTORY OF PRESENT ILLNESS
[de-identified] : 87 year old female presents for follow-up evaluation s/p a bilateral TKR in 1986 and left knee revision in 2010. Patient reports no changes with her symptoms or activities level since the last visit. She does home therapy 2 x per week. She usually uses a walker for ambulation but arrived in a wheelchair. She reports that she does not have any longer the lower legs swelling. She also takes Celebrex daily for her rheumatoid arthritis and Tylenol PRN. Pt is following up with her cardiologist. She would like to get new script for home therapy.\par  \par

## 2023-05-11 ENCOUNTER — APPOINTMENT (OUTPATIENT)
Dept: OBGYN | Facility: CLINIC | Age: 88
End: 2023-05-11

## 2023-06-09 ENCOUNTER — APPOINTMENT (OUTPATIENT)
Dept: OBGYN | Facility: CLINIC | Age: 88
End: 2023-06-09

## 2023-06-13 NOTE — HISTORY OF PRESENT ILLNESS
[de-identified] : 2wk s/p L OLIVIA currently in rehab. Was put on abx for felon of left index finger. Pain controlled.  Continue Regimen: applying Winlevi cream every morning to entire face Render In Strict Bullet Format?: No Detail Level: Zone Modify Regimen: apply tretinoin 0.1% cream every night.

## 2023-10-02 ENCOUNTER — APPOINTMENT (OUTPATIENT)
Dept: ORTHOPEDIC SURGERY | Facility: CLINIC | Age: 88
End: 2023-10-02

## 2024-09-11 ENCOUNTER — APPOINTMENT (OUTPATIENT)
Dept: GYNECOLOGIC ONCOLOGY | Facility: CLINIC | Age: 89
End: 2024-09-11